# Patient Record
Sex: MALE | Race: WHITE | NOT HISPANIC OR LATINO | Employment: FULL TIME | URBAN - METROPOLITAN AREA
[De-identification: names, ages, dates, MRNs, and addresses within clinical notes are randomized per-mention and may not be internally consistent; named-entity substitution may affect disease eponyms.]

---

## 2019-02-12 ENCOUNTER — HISTORICAL (OUTPATIENT)
Dept: LAB | Facility: HOSPITAL | Age: 54
End: 2019-02-12

## 2019-07-30 ENCOUNTER — HISTORICAL (OUTPATIENT)
Dept: ADMINISTRATIVE | Facility: HOSPITAL | Age: 54
End: 2019-07-30

## 2019-07-30 LAB
ALBUMIN SERPL-MCNC: 4.3 GM/DL (ref 3.4–5)
ALBUMIN/GLOB SERPL: 1.39 {RATIO} (ref 1.5–2.5)
ALP SERPL-CCNC: 85 UNIT/L (ref 38–126)
ALT SERPL-CCNC: 23 UNIT/L (ref 7–52)
AST SERPL-CCNC: 19 UNIT/L (ref 15–37)
BILIRUB SERPL-MCNC: 0.8 MG/DL (ref 0.2–1)
BILIRUBIN DIRECT+TOT PNL SERPL-MCNC: 0.2 MG/DL (ref 0–0.5)
BILIRUBIN DIRECT+TOT PNL SERPL-MCNC: 0.6 MG/DL
BUN SERPL-MCNC: 19 MG/DL (ref 7–18)
CALCIUM SERPL-MCNC: 8.9 MG/DL (ref 8.5–10)
CHLORIDE SERPL-SCNC: 102 MMOL/L (ref 98–107)
CHOLEST SERPL-MCNC: 130 MG/DL (ref 0–200)
CHOLEST/HDLC SERPL: 3 {RATIO}
CO2 SERPL-SCNC: 29 MMOL/L (ref 21–32)
CREAT SERPL-MCNC: 1.02 MG/DL (ref 0.6–1.3)
EST. AVERAGE GLUCOSE BLD GHB EST-MCNC: 192 MG/DL
GLOBULIN SER-MCNC: 3.1 GM/DL (ref 1.2–3)
GLUCOSE SERPL-MCNC: 238 MG/DL (ref 74–106)
HBA1C MFR BLD: 8.3 % (ref 4.4–6.4)
HDLC SERPL-MCNC: 44 MG/DL (ref 35–60)
LDLC SERPL CALC-MCNC: 69 MG/DL (ref 0–129)
POTASSIUM SERPL-SCNC: 4.7 MMOL/L (ref 3.5–5.1)
PROT SERPL-MCNC: 7.4 GM/DL (ref 6.4–8.2)
SODIUM SERPL-SCNC: 136 MMOL/L (ref 136–145)
TRIGL SERPL-MCNC: 57 MG/DL (ref 30–150)
VLDLC SERPL CALC-MCNC: 11.4 MG/DL

## 2021-06-01 ENCOUNTER — HISTORICAL (OUTPATIENT)
Dept: ADMINISTRATIVE | Facility: HOSPITAL | Age: 56
End: 2021-06-01

## 2021-06-01 LAB
ERYTHROCYTE [DISTWIDTH] IN BLOOD BY AUTOMATED COUNT: 13.5 % (ref 11.5–17)
PLATELET # BLD AUTO: 171 X10(3)/MCL (ref 130–400)
RBC # BLD AUTO: 4.93 X10(6)/MCL (ref 4.7–6.1)
WBC # SPEC AUTO: 6.6 X10(3)/MCL (ref 4.5–11.5)

## 2022-01-04 LAB — EST CREAT CLEARANCE SER (OHS): 140.41 ML/MIN

## 2022-04-07 ENCOUNTER — HISTORICAL (OUTPATIENT)
Dept: ADMINISTRATIVE | Facility: HOSPITAL | Age: 57
End: 2022-04-07

## 2022-04-24 VITALS
HEIGHT: 64 IN | WEIGHT: 270.06 LBS | DIASTOLIC BLOOD PRESSURE: 78 MMHG | SYSTOLIC BLOOD PRESSURE: 132 MMHG | BODY MASS INDEX: 46.11 KG/M2

## 2022-05-01 NOTE — HISTORICAL [facility]
This is a historical note converted from [facility]. Formatting and pictures may have been removed.  Please reference [facility] for original formatting and attached multimedia. Chief Complaint  4 month recheck  History of Present Illness  56-year-old male presents office today for 4-month recheck of?chronic conditions including diabetes mellitus, hyperlipidemia, gout, low testosterone level, insomnia.? Also carries with him?a history of?LUCI on CPAP therapy.? Also due for?testosterone recheck and?PSA check today.? For the most part,?reports 100% compliance with all prescribed medications. ?Denies any side effects as adverse reaction/intolerance. ?Medications are working well/as intended. ?Patient ?is receiving desired effects. ?He expresses that both he and his wife, for short period of time,?are unable to?afford?prescription?GLP-1 agonist?and as a result they have stopped taking this medication.? Due for recheck of hemoglobin A1c/CMP today.? Reports 100%?compliance with nightly CPAP usage.? Receiving desired effects.  Review of Systems  ?14 point Review of Systems performed with no exceptions for new complaints other than as noted in HPI.  Physical Exam  Vitals & Measurements  HR:?78(Peripheral)? BP:?122/72?  HT:?162.56?cm? HT:?162.56?cm? WT:?123.7?kg? WT:?123.700?kg? BMI:?46.81?  Well developed, well nourished, NAD, alert and oriented x4  Vitals reviewed  Head: NCAT  Eyes: EOMI, conjunctiva WNL, pupils symmetric  Ears: TMs and EACs clear  Nose: Mucosa WNL, No discharge, No sinus tenderness  O/P: No erythema or exudate  Neck: supple, FROM, no LA, no thyromegaly, no bruits auscultated  CV: RRR no MRG, peripheral pulses intact  Pulmonary: Effort normal and breath sounds normal, no wheeze  Abdomen: soft, NTND, no HSM; ? NABS; no peritoneal signs ? ??  Musculoskeletal: ?no tenderness, joints wnl for acute changes, FROM, no CCE  Skin: warm, dry, intact  Neuro: no motor/sensory deficits, cranial nerves grossly intact, cerebellar  function appears intact  Lymphadenopathy: no abnormalities noted  Psychiatric: Cooperative, appropriate mood and affect, appears to have normal judgment  ?  Assessment/Plan  1.?Diabetes mellitus, type 2?E11.9  ?Check hemoglobin A1c?and CMP.? Information provided and given concerning?patient assistance program?by drug /pharmaceutical company?which is the  of his GLP-1 agonist.? Samples given?of Rybelsus today.  2.?Low testosterone?R79.89  ?Check serum testosterone level  3.?HLD (hyperlipidemia)?E78.5  Well-controlled on current medication regimen. ?Continue as prescribed.? Check fasting lipid panel  4.?Microalbuminuria?R80.9  ?Check?urine today  5.?Gout?M10.9  ?No gout flareups. ?Medication regimen  6.?Prostate cancer screening?Z12.5  ?Check PSA  7.?LUCI on CPAP?G47.33  ?Receiving desired effects from nightly use.  8.?Insomnia?G47.00  ?Insomnia is improved with meds; discussed black box warning and precautions regarding abnormal behavior on sedatives; has had no problems; reviewed recommendation to avoid etoh and any other mind altering substances when taking sleep aids; patient will d/c meds and call if has any trouble;  Referrals  Clinic Follow up, *Est. 10/01/21 9:30:00 CDT, Order for future visit, Insomnia, [facility]   Problem List/Past Medical History  Ongoing  BDR (background diabetic retinopathy)  Binge eating disorder  Chronic venous stasis dermatitis of both lower extremities  COVID-19 virus infection  Diabetes mellitus, type 2  ED (erectile dysfunction)  Edema  Family history of prostate cancer  Gout  HLD (hyperlipidemia)  Insomnia  Low testosterone  Medication management  Microalbuminuria  Morbid obesity  LUCI on CPAP  Osteopenia  Right inguinal hernia  Varicose veins of legs  Wellness examination  Historical  Diabetes  High blood pressure  Procedure/Surgical History  Circumcision, surgical excision other than clamp, device, or dorsal slit; older than 28 days of age (2020)    Medications  18 g needles and syringe, See Instructions, 1 refills  23 g needles, See Instructions, 1 refills  allopurinol 100 mg oral tablet, See Instructions  aspirin 325 mg oral tablet, 325 mg= 1 tab(s), Oral, Daily, 3 refills  Bd 3ml Luer-Gavi Syringe 18g X 1 1/2 X 1-1/2 Mis Bd M, See Instructions, 2 refills  Bd Disposable Needle 23gx1 Precision Glide Mis Bd D, See Instructions, 2 refills  Contour Next, See Instructions  Contour test strips, See Instructions, 3 refills  cyclobenzaprine 10 mg oral tablet, See Instructions, 3 refills  glimepiride 2 mg oral tablet, See Instructions, 1 refills  indomethacin 50 mg oral capsule, 50 mg= 1 cap(s), Oral, BID, 1 refills  lisinopril 5 mg oral tablet, 5 mg= 1 tab(s), Oral, Daily  meloxicam 15 mg oral tablet, 15 mg= 1 tab(s), Oral, Daily, 1 refills  MetFORMIN (Eqv-Glucophage XR) 500 mg oral tablet, extended release, See Instructions  metoprolol succinate 50 mg oral tablet, extended release, See Instructions  Pen needles, See Instructions, 3 refills  Ranexa 500mg Tab extended release, 500 mg= 1 tab(s), Oral, BID  rosuvastatin 20 mg oral tablet, See Instructions  Rybelsus 14 mg oral tablet, 14 mg= 1 tab(s), Oral, Daily, 5 refills  sildenafil 100 mg oral tablet, 100 mg= 1 tab(s), Oral, As Directed, 10 refills  syringes and 22g 1 1/2 inch needle, See Instructions, 1 refills  Test strips and lancets, See Instructions, 3 refills  testosterone cypionate 200 mg/mL intramuscular solution, See Instructions  zolpidem 10 mg oral tablet, 10 mg= 1 tab(s), Oral, qPM  Allergies  No Known Medication Allergies  Social History  Abuse/Neglect  No, 06/01/2021  Alcohol  Current, 04/26/2018  Employment/School  Employed, Work/School description: owns chicken on the [employer] 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 Living situation: Home/Independent., 04/26/2018  Substance Use  Never, 04/26/2018  Tobacco  Former smoker, quit more than 30 days ago, N/A, 06/01/2021  Family  History  Low testosterone: Brother.  Osteoarthritis: Mother.  PD: Father.  Prostate cancer: Father.  Immunizations  Vaccine Date Status   influenza virus vaccine, inactivated 10/14/2020 Given   influenza virus vaccine, inactivated 10/22/2019 Recorded   pneumococcal 13-valent conjugate vaccine 05/2014 Recorded   tetanus/diphth/pertuss (Tdap) adult/adol 11/13/2012 Recorded   pneumococcal 23-polyvalent vaccine 05/12/2011 Recorded   Health Maintenance  Health Maintenance  ???Pending?(in the next year)  ??? ??OverDue  ??? ? ? ?Depression Screening due??08/23/17??and every 1??year(s)  ??? ? ? ?Alcohol Misuse Screening due??01/02/21??and every 1??year(s)  ??? ??Due?  ??? ? ? ?ADL Screening due??06/16/21??and every 1??year(s)  ??? ? ? ?Diabetes Maintenance-Foot Exam due??06/16/21??Unknown Frequency  ??? ? ? ?Zoster Vaccine due??06/16/21??Unknown Frequency  ??? ??Due In Future?  ??? ? ? ?Diabetes Maintenance-Eye Exam not due until??08/18/21??and every 1??year(s)  ??? ? ? ?Influenza Vaccine not due until??10/01/21??and every 1??day(s)  ??? ? ? ?Obesity Screening not due until??01/01/22??and every 1??year(s)  ??? ? ? ?Aspirin Therapy for CVD Prevention not due until??01/25/22??and every 1??year(s)  ??? ? ? ?Blood Pressure Screening not due until??06/01/22??and every 1??year(s)  ??? ? ? ?Body Mass Index Check not due until??06/01/22??and every 1??year(s)  ??? ? ? ?Diabetes Maintenance-HgbA1c not due until??06/01/22??and every 1??year(s)  ??? ? ? ?Diabetes Maintenance-Fasting Lipid Profile not due until??06/01/22??and every 1??year(s)  ??? ? ? ?Diabetes Maintenance-Serum Creatinine not due until??06/01/22??and every 1??year(s)  ???Satisfied?(in the past 1 year)  ??? ??Satisfied?  ??? ? ? ?Aspirin Therapy for CVD Prevention on??01/25/21.??Satisfied by Anil Arambula MD  ??? ? ? ?Blood Pressure Screening on??06/01/21.??Satisfied by Gemma Ashraf LPN  ??? ? ? ?Body Mass Index Check on??06/01/21.??Satisfied by Gemma Ashraf LPN  ??? ?  ? ?Diabetes Maintenance-Eye Exam on??08/18/20.??Satisfied by Helena Sr  ??? ? ? ?Diabetes Maintenance-Fasting Lipid Profile on??06/01/21.??Satisfied by Jose De Jesus Troncoso  ??? ? ? ?Diabetes Maintenance-HgbA1c on??06/01/21.??Satisfied by Jose De Jesus Troncoso  ??? ? ? ?Diabetes Maintenance-Serum Creatinine on??06/01/21.??Satisfied by Jose De Jesus Troncoso  ??? ? ? ?Diabetes Screening on??06/01/21.??Satisfied by Jose De Jesus Troncoso  ??? ? ? ?Hypertension Management-BMP on??06/01/21.??Satisfied by Jose De Jesus Troncoso  ??? ? ? ?Hypertension Management-Blood Pressure on??06/01/21.??Satisfied by Gemma Ashraf LPN  ??? ? ? ?Influenza Vaccine on??10/14/20.??Satisfied by Anil Arambula MD  ??? ? ? ?Lipid Screening on??06/01/21.??Satisfied by Jose De Jesus Troncoso  ??? ? ? ?Obesity Screening on??06/01/21.??Satisfied by Gemma Ashraf LPN  ?      Physician?was in the building when patient was?seen and examined by the nurse practitioner.? I concur with the?assessment/plan/management?of the patient.

## 2022-05-04 PROBLEM — E11.29 TYPE 2 DIABETES MELLITUS WITH MICROALBUMINURIA, WITHOUT LONG-TERM CURRENT USE OF INSULIN: Status: ACTIVE | Noted: 2022-05-04

## 2022-05-04 PROBLEM — R79.89 LOW TESTOSTERONE: Status: ACTIVE | Noted: 2022-05-04

## 2022-05-04 PROBLEM — I87.2 VENOUS STASIS DERMATITIS OF BOTH LOWER EXTREMITIES: Status: ACTIVE | Noted: 2022-05-04

## 2022-05-04 PROBLEM — E78.2 MIXED HYPERLIPIDEMIA: Status: ACTIVE | Noted: 2022-05-04

## 2022-05-04 PROBLEM — M1A.00X0 IDIOPATHIC CHRONIC GOUT WITHOUT TOPHUS: Status: ACTIVE | Noted: 2022-05-04

## 2022-05-04 PROBLEM — I10 PRIMARY HYPERTENSION: Status: ACTIVE | Noted: 2022-05-04

## 2022-05-04 PROBLEM — R80.9 TYPE 2 DIABETES MELLITUS WITH MICROALBUMINURIA, WITHOUT LONG-TERM CURRENT USE OF INSULIN: Status: ACTIVE | Noted: 2022-05-04

## 2022-05-04 PROBLEM — G47.33 OSA ON CPAP: Status: ACTIVE | Noted: 2022-05-04

## 2022-08-30 PROBLEM — R60.9 EDEMA: Status: ACTIVE | Noted: 2022-08-30

## 2022-08-30 PROBLEM — E78.5 HYPERLIPIDEMIA: Status: ACTIVE | Noted: 2022-05-04

## 2022-08-30 PROBLEM — M10.9 GOUT: Status: ACTIVE | Noted: 2022-08-30

## 2022-08-30 PROBLEM — E11.9 TYPE 2 DIABETES MELLITUS: Status: ACTIVE | Noted: 2022-05-04

## 2022-08-30 PROBLEM — M85.80 OSTEOPENIA: Status: ACTIVE | Noted: 2022-08-30

## 2022-08-30 PROBLEM — I83.90 VARICOSE VEINS OF LOWER EXTREMITY: Status: ACTIVE | Noted: 2022-08-30

## 2022-08-30 PROBLEM — F50.819 BINGE EATING DISORDER: Status: ACTIVE | Noted: 2022-08-30

## 2022-08-30 PROBLEM — G47.00 INSOMNIA: Status: ACTIVE | Noted: 2022-08-30

## 2022-08-30 PROBLEM — E66.01 MORBID OBESITY: Status: ACTIVE | Noted: 2022-08-30

## 2022-08-30 PROBLEM — F50.81 BINGE EATING DISORDER: Status: ACTIVE | Noted: 2022-08-30

## 2022-08-30 PROBLEM — R80.9 MICROALBUMINURIA: Status: ACTIVE | Noted: 2022-08-30

## 2022-08-30 PROBLEM — E11.3299 NONPROLIFERATIVE DIABETIC RETINOPATHY: Status: ACTIVE | Noted: 2022-08-30

## 2022-08-30 PROBLEM — E34.9 TESTOSTERONE DEFICIENCY: Status: ACTIVE | Noted: 2022-08-30

## 2022-09-15 ENCOUNTER — HISTORICAL (OUTPATIENT)
Dept: ADMINISTRATIVE | Facility: HOSPITAL | Age: 57
End: 2022-09-15

## 2022-12-23 PROCEDURE — 86803 HEPATITIS C AB TEST: CPT | Performed by: FAMILY MEDICINE

## 2022-12-23 PROCEDURE — 87389 HIV-1 AG W/HIV-1&-2 AB AG IA: CPT | Performed by: FAMILY MEDICINE

## 2023-01-24 ENCOUNTER — CLINICAL SUPPORT (OUTPATIENT)
Dept: BARIATRICS | Facility: HOSPITAL | Age: 58
End: 2023-01-24
Attending: SURGERY
Payer: COMMERCIAL

## 2023-01-24 ENCOUNTER — CLINICAL SUPPORT (OUTPATIENT)
Dept: BARIATRICS | Facility: HOSPITAL | Age: 58
End: 2023-01-24
Attending: SURGERY

## 2023-01-24 VITALS — BODY MASS INDEX: 44.67 KG/M2 | WEIGHT: 268.13 LBS | HEIGHT: 65 IN

## 2023-01-24 NOTE — PROGRESS NOTES
BEHAVIOR MODIFICATION AND EXERCISE CONSULT    Non Surgical: [] Surgical: [x]      PERSONAL:     What initiated your interest in bariatric surgery?   Better quality of health    Marital Status: []Single   [x]   []   []      Do you have children? 2 (22 and 25 y.o.)    What is your highest level of education completed? Some college    Who is your social or relational support? My wife    Do you work? 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 yes        PHYSICAL ACTIVITY:    Do you currently exercise: []Yes   [x]No    If so, please describe:      Have you experienced any injuries and/or restrictions that may limit your physical activity? [x]Yes   [x]No    How do you feel about exercise? Enjoy it, used to exercise before COVID.      BEHAVIORS:    What behavior(s) would you like to change in order to be healthy? Consistent exercise routine, eating heatlhier, stop snacking.    On a scale of 1-10 (1-extremely low, 10-extremely high), how motivated are you to change your behavior(s)? 8    Do you currently use any type of tobacco products (vape, dip, cigarettes, etc.) No    If yes, on average, how many and/or how often do you use these products on a daily basis?    How many hours of sleep do you average? 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    Is your appetite affected by stress? yes    How do you cope and/or manage stress? hunting      NOTES: A body composition was conducted and patient was educated on results. Current weight is 266.7 lbs. Goal weight is 190-200 lbs. Waist/hip is 52/51 inches. Handout: emotional connections to food.    Isabella Miller, MILLY, CPT, CHC

## 2023-01-24 NOTE — PROGRESS NOTES
NUTRITIONAL CONSULT    Initial assessment for sleeve gastrectomy   Non Surgical: []      PAST HISTORY:   Dieting attempts include self directed dieting, wt loss meds ([facility]), atkins, ketogenic diets   Highest weight: 275#    CLINICAL DATA:  Height: 64.5in  Weight: 268 lbs  IBW: 133 lbs  BMI: 45 kg/m2  Bariatric goal weight (125% EBW): 166 lbs  Patient's goal weight: 190-200 lbs    FAMILY HISTORY OF OBESITY:   [x]Yes    []No            WHAT SIDE OF THE FAMILY?   []Mother   []Father   [x]Sibling   []Child     []Extended    []Adopted       Goal for Bariatric Surgery: to improve health and reduce medications    NUTRITION & HEALTH HISTORY:  Greatest challenge:  owning a restaurant, lack of results when dieting in past    Current Dietary Patterns:  Meal pattern: 2-3 meals per day   Snacking: throughout the evening / day Type: sweets, cookies, cheese, nuts  Vegetables: Likes a variety. Eats daily.  Fruits: Likes a variety. Eats daily.  Beverages: water, diet soda, coffee without sugar, and hot tea  Alcohol consumption: Monthly. Type:   Dining out: Daily. Mostly  pt owns a restaurant .  Grocery shopping and food prep: []Self   [x]Spouse   []Other:   Emotional eating: [x]Yes   []No Which emotions if yes: stress  Nighttime snacking: [x]Yes   []No Middle of night: []Yes   [x]No  Before bedtime: [x]Yes   []No  goes to bed around 1AM  Hx of disordered eating behaviors: []Yes   []No Anorexia: []Yes   []No Bulimia: []Yes   []No  Purging: []Yes   []No   Binging: [x]Yes   []No  History of vitamin/mineral deficiencies:   []Yes   [x]No    Vitamins / Minerals / Herbs:   MVI    Food Allergies:   NKFA      ASSESSMENT:  Patient reports attempts at weight loss, only to regain lost weight.  Patient demonstrated knowledge of healthy eating behaviors and exercise patterns; admits to not eating healthy and not exercising at this point.  Patient states willingness to change lifestyle and make behavior modifications.  Expect good   compliance after surgery at this time.        ESTIMATED NEEDS:  Calories: 5742-1424 kcals/d  (20-25 kcal/kg adjusted BW/d)  Protein:  75-83g/d  (1.0-1.1 g/kg adjusted BW/d)  Fluid:   2432 ml/d  (20 mL/kg actual BW/d)    BARIATRIC DIET DISCUSSION:  Discussed diet after surgery and related to patients food record.  Reviewed diet progression before and after surgery.  Stressed importance of exercise and its role in achieving weight loss goals.    RECOMMENDATIONS:  Patient is a good candidate for bariatric surgery.      PLAN:  Resume work-up for surgery.  Continue to review Bariatric Nutrition Guidebook at home and call with any questions.  Reduce frequency of dining out.

## 2023-04-18 ENCOUNTER — CLINICAL SUPPORT (OUTPATIENT)
Dept: BARIATRICS | Facility: HOSPITAL | Age: 58
End: 2023-04-18

## 2023-04-18 NOTE — PROGRESS NOTES
Date of education: 4/18/23  Pt education type: [x]Pre op  []Post op  Surgery date: 5/15/23  Type of surgery: sleeve gastrectomy     Education was provided on:   []Importance of protein and vitamin protocol  []Importance of drinking  fl oz/day of non carbonated sugar free non caffeinated beverages  []Importance of following dietary protocol  []Importance of early ambulation postop   []Use of incentive spirometer 10 times an hour while awake  []Non opiod pain management post op   []Discontinuing use of meds containing aspirin, ibuprofen, NSAIDs, post op  []Signs and symptoms of immediate and long term complications post-op  []Prevention and signs and symptoms of blood clots   []Prevention and signs of infection  []Reviewed medication regimen  [x]Importance of adhering to behavioral changes  [x]Importance of following exercise protocol      Barriers to learning:  [x]None evident  []Acuity of illness  []Cognitive defects  []Cultural barriers  []Desire/Motivation  []Difficulty concentrating  []Emotional state  []Financial concerns  []Hearing deficit  []Language barrier  []Literacy  []Memory problems  []Vision impairment     Teaching methods:  [x]Demonstration  []Explanation  []Printed materials  []Teach back  []Virtual/web based    Expected Compliance:  [x]Good  []Fair  []Poor    Additional Notes:  A body composition was conducted.

## 2023-04-24 PROCEDURE — 85730 THROMBOPLASTIN TIME PARTIAL: CPT | Performed by: FAMILY MEDICINE

## 2023-04-28 ENCOUNTER — PATIENT MESSAGE (OUTPATIENT)
Dept: ADMINISTRATIVE | Facility: OTHER | Age: 58
End: 2023-04-28
Payer: COMMERCIAL

## 2023-05-01 ENCOUNTER — CLINICAL SUPPORT (OUTPATIENT)
Dept: BARIATRICS | Facility: HOSPITAL | Age: 58
End: 2023-05-01

## 2023-05-01 VITALS — WEIGHT: 256 LBS | BODY MASS INDEX: 42.65 KG/M2 | HEIGHT: 65 IN

## 2023-05-01 VITALS — BODY MASS INDEX: 42.68 KG/M2 | WEIGHT: 256.5 LBS

## 2023-05-01 NOTE — PROGRESS NOTES
Patient attended preop education visit with team. Patient missed 2 on questions 13-18. Corrections were discussed. No issues noted. Patient has lost 12 lbs.     MILLY Flaherty, CPT, CHC

## 2023-05-01 NOTE — PROGRESS NOTES
Pt attended pre-op visit with bariatric team and completed questionnaire. Pre- and immediate post-op guidelines were reviewed. Pt confirmed knowledge and expressed understanding.     Weight: 256#  Weight loss since pre-op class:  -12#

## 2023-05-02 NOTE — PROGRESS NOTES
Date of education: 4/18/23  Pt education type: [x]Pre op  []Post op  Surgery date: 5/15/23  Type of surgery: sleeve gastrectomy     Education was provided on:   []Importance of protein and vitamin protocol  []Importance of drinking  fl oz/day of non carbonated sugar free non caffeinated beverages  []Importance of following dietary protocol  [x]Importance of early ambulation postop   [x]Use of incentive spirometer 10 times an hour while awake  [x]Non opiod pain management post op   [x]Discontinuing use of meds containing aspirin, ibuprofen, NSAIDs, post op  [x]Signs and symptoms of immediate and long term complications post-op  [x]Prevention and signs and symptoms of blood clots   [x]Prevention and signs of infection  [x]Reviewed medication regimen  []Importance of adhering to behavioral changes  []Importance of following exercise protocol      Barriers to learning:  [x]None evident  []Acuity of illness  []Cognitive defects  []Cultural barriers  []Desire/Motivation  []Difficulty concentrating  []Emotional state  []Financial concerns  []Hearing deficit  []Language barrier  []Literacy  []Memory problems  []Vision impairment     Teaching methods:  [x]Demonstration  []Explanation  []Printed materials  []Teach back  []Virtual/web based    Expected Compliance:  [x]Good  []Fair  []Poor

## 2023-05-08 ENCOUNTER — CLINICAL SUPPORT (OUTPATIENT)
Dept: BARIATRICS | Facility: HOSPITAL | Age: 58
End: 2023-05-08

## 2023-05-08 VITALS — WEIGHT: 251 LBS | BODY MASS INDEX: 41.82 KG/M2 | HEIGHT: 65 IN

## 2023-05-11 NOTE — H&P
[facility] Surgical - Periop Services  History & Physical  Surgery    SUBJECTIVE:     Chief Complaint/Reason for Admission: The patient is a 58 y.o. obese male admitted for elective vertical sleeve gastrectomy.  Body mass index is 41.77 kg/m².     History of Present Illness:       Patient Active Problem List    Diagnosis Date Noted    Binge eating disorder 08/30/2022    Edema 08/30/2022    Gout 08/30/2022    Insomnia 08/30/2022    Microalbuminuria 08/30/2022    Morbid obesity with BMI of 40.0-44.9, adult 08/30/2022    Nonproliferative diabetic retinopathy 08/30/2022    Osteopenia 08/30/2022    Testosterone deficiency 08/30/2022    Varicose veins of lower extremity 08/30/2022    DIABETES 05/04/2022    Hyperlipidemia 05/04/2022    Low testosterone 05/04/2022    Idiopathic chronic gout without tophus 05/04/2022    Primary hypertension 05/04/2022    LUCI on CPAP 05/04/2022    Venous stasis dermatitis 05/04/2022     No medications prior to admission.     Review of patient's allergies indicates:  No Known Allergies     Past Medical History:   Diagnosis Date    Asymptomatic varicose veins of unspecified lower extremity     BDR (background diabetic retinopathy)     Binge eating disorder     Chronic venous stasis dermatitis of both lower extremities     Family history of prostate cancer     Gout, unspecified     History of COVID-19     HLD (hyperlipidemia)     Insomnia     Low testosterone     Male erectile dysfunction, unspecified     Microalbuminuria     Obesity, unspecified     LUCI (obstructive sleep apnea)     Osteopenia     Right inguinal hernia     Swelling     Type 2 diabetes mellitus without complications       Past Surgical History:   Procedure Laterality Date    CIRCUMCISION      COLONOSCOPY      ESOPHAGOGASTRODUODENOSCOPY N/A 3/17/2023    Procedure: EGD (ESOPHAGOGASTRODUODENOSCOPY);  Surgeon: Dm Tomas MD;  Location: [facility] OR;  Service: General;  Laterality: N/A;      Family History   Problem Relation  "Age of Onset    Diabetes Maternal Uncle       Social History     Tobacco Use    Smoking status: Former     Years: 15.00     Types: Cigarettes    Smokeless tobacco: Never   Substance Use Topics    Alcohol use: Not Currently          Review of Systems:  Pertinent items are noted in HPI.    OBJECTIVE:     Vital signs in last 24 hours:         Ht 5' 5" (1.651 m)   Wt 113.9 kg (251 lb)   BMI 41.77 kg/m²     General Appearance:    Alert, cooperative, no distress, appears stated age   Head:    Normocephalic, without obvious abnormality, atraumatic   Eyes:    PERRL, conjunctiva/corneas clear, EOM's intact, fundi     benign, both eyes   Ears:    Normal TM's and external ear canals, both ears   Nose:   Nares normal, septum midline, mucosa normal, no drainage    or sinus tenderness   Throat:   Lips, mucosa, and tongue normal; teeth and gums normal   Neck:   Supple, symmetrical, trachea midline, no adenopathy;     thyroid:  no enlargement/tenderness/nodules; no carotid    bruit or JVD   Back:     Symmetric, no curvature, ROM normal, no CVA tenderness   Lungs:     Clear to auscultation bilaterally, respirations unlabored   Chest Wall:    No tenderness or deformity    Heart:    Regular rate and rhythm, S1 and S2 normal, no murmur, rub   or gallop   Breast Exam:    No tenderness, masses, or nipple abnormality   Abdomen:     Soft, non-tender, bowel sounds active all four quadrants,     no masses, no organomegaly   Genitalia:    Normal female without lesion, discharge or tenderness   Rectal:    Normal tone, no masses or tenderness;    guaiac negative stool   Extremities:   Extremities normal, atraumatic, no cyanosis or edema   Pulses:   2+ and symmetric all extremities   Skin:   Skin color, texture, turgor normal, no rashes or lesions   Lymph nodes:   Cervical, supraclavicular, and axillary nodes normal   Neurologic:   CNII-XII intact, normal strength, sensation and reflexes     throughout       Data Review: CBC:   Lab Results "   Component Value Date    WBC 5.4 04/24/2023    RBC 5.23 04/24/2023    HGB 14.2 04/24/2023    HCT 42.7 04/24/2023     04/24/2023     BMP:   Lab Results   Component Value Date     04/24/2023    K 4.6 04/24/2023    CO2 31 04/24/2023    BUN 20.0 (H) 04/24/2023    CREATININE 1.02 04/24/2023    CALCIUM 9.7 04/24/2023     Coagulation: No results found for: PT, INR, APTT    ASSESSMENT/PLAN:     Morbid obesity with failure of conservative therapy.      The patient was informed that risks include, but are not limited to: death, staple line leak, obstruction, bleeding, and sepsis. Any of these could require further surgery. Other risks include DVT, PE, pneumonia, wound dehiscence, hernia, wound infection, the need for dilatations of his sleeve gastrectomy, vitamin abnormalites, and the inability to lose appropriate weight and keep it off.     We discussed that our goal is to ameliorate his medical problems and not to obtain a specific body mass index. He understands the risks and benefits and wishes to proceed with the procedure. He has signed a consent form.

## 2023-05-14 ENCOUNTER — ANESTHESIA EVENT (OUTPATIENT)
Dept: SURGERY | Facility: HOSPITAL | Age: 58
DRG: 621 | End: 2023-05-14

## 2023-05-15 ENCOUNTER — HOSPITAL ENCOUNTER (INPATIENT)
Facility: HOSPITAL | Age: 58
LOS: 1 days | Discharge: HOME | DRG: 621 | End: 2023-05-16
Attending: SURGERY | Admitting: SURGERY

## 2023-05-15 ENCOUNTER — ANESTHESIA (OUTPATIENT)
Dept: SURGERY | Facility: HOSPITAL | Age: 58
DRG: 621 | End: 2023-05-15

## 2023-05-15 DIAGNOSIS — E66.01 MORBID OBESITY: ICD-10-CM

## 2023-05-15 LAB
ABORH RETYPE: NORMAL
GROUP & RH: NORMAL
INDIRECT COOMBS GEL: NORMAL
POCT GLUCOSE: 110 MG/DL (ref 70–110)
POCT GLUCOSE: 160 MG/DL (ref 70–110)
POCT GLUCOSE: 176 MG/DL (ref 70–110)
POCT GLUCOSE: 232 MG/DL (ref 70–110)
SPECIMEN OUTDATE: NORMAL

## 2023-05-15 PROCEDURE — 63600175 PHARM REV CODE 636 W HCPCS: Performed by: ANESTHESIOLOGY

## 2023-05-15 PROCEDURE — D9220A PRA ANESTHESIA: ICD-10-PCS | Mod: ,,, | Performed by: ANESTHESIOLOGY

## 2023-05-15 PROCEDURE — 82962 GLUCOSE BLOOD TEST: CPT | Performed by: SURGERY

## 2023-05-15 PROCEDURE — 25000003 PHARM REV CODE 250: Performed by: NURSE PRACTITIONER

## 2023-05-15 PROCEDURE — 71000033 HC RECOVERY, INTIAL HOUR: Performed by: SURGERY

## 2023-05-15 PROCEDURE — 71000015 HC POSTOP RECOV 1ST HR: Performed by: SURGERY

## 2023-05-15 PROCEDURE — 11000001 HC ACUTE MED/SURG PRIVATE ROOM

## 2023-05-15 PROCEDURE — 37000009 HC ANESTHESIA EA ADD 15 MINS: Performed by: SURGERY

## 2023-05-15 PROCEDURE — 63600175 PHARM REV CODE 636 W HCPCS: Performed by: NURSE ANESTHETIST, CERTIFIED REGISTERED

## 2023-05-15 PROCEDURE — 63600175 PHARM REV CODE 636 W HCPCS: Performed by: NURSE PRACTITIONER

## 2023-05-15 PROCEDURE — 36000711: Performed by: SURGERY

## 2023-05-15 PROCEDURE — 25000003 PHARM REV CODE 250: Performed by: NURSE ANESTHETIST, CERTIFIED REGISTERED

## 2023-05-15 PROCEDURE — 37000008 HC ANESTHESIA 1ST 15 MINUTES: Performed by: SURGERY

## 2023-05-15 PROCEDURE — 86900 BLOOD TYPING SEROLOGIC ABO: CPT | Performed by: SURGERY

## 2023-05-15 PROCEDURE — 27201423 OPTIME MED/SURG SUP & DEVICES STERILE SUPPLY: Performed by: SURGERY

## 2023-05-15 PROCEDURE — 71000039 HC RECOVERY, EACH ADD'L HOUR: Performed by: SURGERY

## 2023-05-15 PROCEDURE — C9290 INJ, BUPIVACAINE LIPOSOME: HCPCS | Performed by: SURGERY

## 2023-05-15 PROCEDURE — A4216 STERILE WATER/SALINE, 10 ML: HCPCS | Performed by: SURGERY

## 2023-05-15 PROCEDURE — 63600175 PHARM REV CODE 636 W HCPCS

## 2023-05-15 PROCEDURE — 25000003 PHARM REV CODE 250: Performed by: SURGERY

## 2023-05-15 PROCEDURE — 63600175 PHARM REV CODE 636 W HCPCS: Performed by: SURGERY

## 2023-05-15 PROCEDURE — 36000710: Performed by: SURGERY

## 2023-05-15 PROCEDURE — D9220A PRA ANESTHESIA: Mod: ,,, | Performed by: ANESTHESIOLOGY

## 2023-05-15 PROCEDURE — 71000016 HC POSTOP RECOV ADDL HR: Performed by: SURGERY

## 2023-05-15 RX ORDER — RANOLAZINE 500 MG/1
500 TABLET, EXTENDED RELEASE ORAL 2 TIMES DAILY
Status: DISCONTINUED | OUTPATIENT
Start: 2023-05-16 | End: 2023-05-16 | Stop reason: HOSPADM

## 2023-05-15 RX ORDER — CLONIDINE 0.1 MG/24H
1 PATCH, EXTENDED RELEASE TRANSDERMAL ONCE AS NEEDED
Status: DISCONTINUED | OUTPATIENT
Start: 2023-05-15 | End: 2023-05-16 | Stop reason: HOSPADM

## 2023-05-15 RX ORDER — ONDANSETRON 2 MG/ML
4 INJECTION INTRAMUSCULAR; INTRAVENOUS DAILY PRN
Status: DISCONTINUED | OUTPATIENT
Start: 2023-05-15 | End: 2023-05-15

## 2023-05-15 RX ORDER — KETOROLAC TROMETHAMINE 30 MG/ML
30 INJECTION, SOLUTION INTRAMUSCULAR; INTRAVENOUS EVERY 6 HOURS
Status: DISCONTINUED | OUTPATIENT
Start: 2023-05-15 | End: 2023-05-16 | Stop reason: HOSPADM

## 2023-05-15 RX ORDER — PANTOPRAZOLE SODIUM 40 MG/1
40 TABLET, DELAYED RELEASE ORAL DAILY
Qty: 30 TABLET | Refills: 0 | Status: SHIPPED | OUTPATIENT
Start: 2023-05-15 | End: 2023-08-28

## 2023-05-15 RX ORDER — ACETAMINOPHEN 500 MG
1000 TABLET ORAL
Status: COMPLETED | OUTPATIENT
Start: 2023-05-15 | End: 2023-05-15

## 2023-05-15 RX ORDER — HYDRALAZINE HYDROCHLORIDE 20 MG/ML
10 INJECTION INTRAMUSCULAR; INTRAVENOUS EVERY 6 HOURS PRN
Status: DISCONTINUED | OUTPATIENT
Start: 2023-05-15 | End: 2023-05-16 | Stop reason: HOSPADM

## 2023-05-15 RX ORDER — KETOROLAC TROMETHAMINE 10 MG/1
10 TABLET, FILM COATED ORAL EVERY 6 HOURS
Qty: 12 TABLET | Refills: 0 | Status: SHIPPED | OUTPATIENT
Start: 2023-05-15 | End: 2023-05-18

## 2023-05-15 RX ORDER — KETOROLAC TROMETHAMINE 30 MG/ML
30 INJECTION, SOLUTION INTRAMUSCULAR; INTRAVENOUS EVERY 6 HOURS
Status: DISCONTINUED | OUTPATIENT
Start: 2023-05-15 | End: 2023-05-15

## 2023-05-15 RX ORDER — ALLOPURINOL 100 MG/1
100 TABLET ORAL 2 TIMES DAILY
Status: DISCONTINUED | OUTPATIENT
Start: 2023-05-16 | End: 2023-05-16 | Stop reason: HOSPADM

## 2023-05-15 RX ORDER — CEFAZOLIN SODIUM 2 G/50ML
2 SOLUTION INTRAVENOUS
Status: DISCONTINUED | OUTPATIENT
Start: 2023-05-15 | End: 2023-05-15

## 2023-05-15 RX ORDER — MIDAZOLAM HYDROCHLORIDE 1 MG/ML
2 INJECTION INTRAMUSCULAR; INTRAVENOUS ONCE AS NEEDED
Status: COMPLETED | OUTPATIENT
Start: 2023-05-15 | End: 2023-05-15

## 2023-05-15 RX ORDER — ACETAMINOPHEN 500 MG
1000 TABLET ORAL EVERY 8 HOURS
Status: DISCONTINUED | OUTPATIENT
Start: 2023-05-16 | End: 2023-05-16 | Stop reason: HOSPADM

## 2023-05-15 RX ORDER — SODIUM CHLORIDE, SODIUM LACTATE, POTASSIUM CHLORIDE, CALCIUM CHLORIDE 600; 310; 30; 20 MG/100ML; MG/100ML; MG/100ML; MG/100ML
INJECTION, SOLUTION INTRAVENOUS CONTINUOUS
Status: DISCONTINUED | OUTPATIENT
Start: 2023-05-15 | End: 2023-05-15

## 2023-05-15 RX ORDER — PANTOPRAZOLE SODIUM 40 MG/1
40 TABLET, DELAYED RELEASE ORAL DAILY
Status: DISCONTINUED | OUTPATIENT
Start: 2023-05-16 | End: 2023-05-16 | Stop reason: HOSPADM

## 2023-05-15 RX ORDER — SODIUM CHLORIDE 9 MG/ML
INJECTION, SOLUTION INTRAMUSCULAR; INTRAVENOUS; SUBCUTANEOUS
Status: DISCONTINUED | OUTPATIENT
Start: 2023-05-15 | End: 2023-05-15 | Stop reason: HOSPADM

## 2023-05-15 RX ORDER — IBUPROFEN 200 MG
15 TABLET ORAL
Status: DISCONTINUED | OUTPATIENT
Start: 2023-05-15 | End: 2023-05-16 | Stop reason: HOSPADM

## 2023-05-15 RX ORDER — LIDOCAINE HYDROCHLORIDE 10 MG/ML
1 INJECTION, SOLUTION EPIDURAL; INFILTRATION; INTRACAUDAL; PERINEURAL ONCE
Status: CANCELLED | OUTPATIENT
Start: 2023-05-15 | End: 2023-05-15

## 2023-05-15 RX ORDER — BUPIVACAINE HYDROCHLORIDE 2.5 MG/ML
INJECTION, SOLUTION EPIDURAL; INFILTRATION; INTRACAUDAL
Status: DISPENSED
Start: 2023-05-15 | End: 2023-05-15

## 2023-05-15 RX ORDER — ENOXAPARIN SODIUM 100 MG/ML
40 INJECTION SUBCUTANEOUS
Status: COMPLETED | OUTPATIENT
Start: 2023-05-15 | End: 2023-05-15

## 2023-05-15 RX ORDER — ACETAMINOPHEN 10 MG/ML
1000 INJECTION, SOLUTION INTRAVENOUS EVERY 8 HOURS
Status: DISCONTINUED | OUTPATIENT
Start: 2023-05-15 | End: 2023-05-15

## 2023-05-15 RX ORDER — EPHEDRINE SULFATE 50 MG/ML
INJECTION, SOLUTION INTRAVENOUS
Status: DISCONTINUED | OUTPATIENT
Start: 2023-05-15 | End: 2023-05-15

## 2023-05-15 RX ORDER — METOPROLOL SUCCINATE 25 MG/1
25 TABLET, EXTENDED RELEASE ORAL 2 TIMES DAILY
Status: DISCONTINUED | OUTPATIENT
Start: 2023-05-16 | End: 2023-05-16 | Stop reason: HOSPADM

## 2023-05-15 RX ORDER — ONDANSETRON 4 MG/1
4 TABLET, ORALLY DISINTEGRATING ORAL EVERY 6 HOURS PRN
Status: DISCONTINUED | OUTPATIENT
Start: 2023-05-16 | End: 2023-05-16 | Stop reason: HOSPADM

## 2023-05-15 RX ORDER — ACETAMINOPHEN 10 MG/ML
1000 INJECTION, SOLUTION INTRAVENOUS EVERY 8 HOURS
Status: COMPLETED | OUTPATIENT
Start: 2023-05-15 | End: 2023-05-16

## 2023-05-15 RX ORDER — SODIUM CHLORIDE, SODIUM GLUCONATE, SODIUM ACETATE, POTASSIUM CHLORIDE AND MAGNESIUM CHLORIDE 30; 37; 368; 526; 502 MG/100ML; MG/100ML; MG/100ML; MG/100ML; MG/100ML
INJECTION, SOLUTION INTRAVENOUS CONTINUOUS
Status: DISCONTINUED | OUTPATIENT
Start: 2023-05-15 | End: 2023-05-15

## 2023-05-15 RX ORDER — ASPIRIN 325 MG
81 TABLET ORAL DAILY
COMMUNITY
End: 2023-12-26

## 2023-05-15 RX ORDER — ACETAMINOPHEN 325 MG/1
650 TABLET ORAL EVERY 4 HOURS PRN
Status: CANCELLED | OUTPATIENT
Start: 2023-05-15

## 2023-05-15 RX ORDER — LORAZEPAM 2 MG/ML
0.5 INJECTION INTRAMUSCULAR EVERY 8 HOURS PRN
Status: ACTIVE | OUTPATIENT
Start: 2023-05-15 | End: 2023-05-16

## 2023-05-15 RX ORDER — ZOLPIDEM TARTRATE 5 MG/1
10 TABLET ORAL NIGHTLY
Status: DISCONTINUED | OUTPATIENT
Start: 2023-05-16 | End: 2023-05-16 | Stop reason: HOSPADM

## 2023-05-15 RX ORDER — ACETAMINOPHEN 325 MG/1
650 TABLET ORAL EVERY 4 HOURS PRN
Status: DISCONTINUED | OUTPATIENT
Start: 2023-05-15 | End: 2023-05-15

## 2023-05-15 RX ORDER — ENOXAPARIN SODIUM 100 MG/ML
40 INJECTION SUBCUTANEOUS DAILY
Status: DISCONTINUED | OUTPATIENT
Start: 2023-05-16 | End: 2023-05-16 | Stop reason: HOSPADM

## 2023-05-15 RX ORDER — GABAPENTIN 300 MG/1
600 CAPSULE ORAL ONCE
Status: COMPLETED | OUTPATIENT
Start: 2023-05-15 | End: 2023-05-15

## 2023-05-15 RX ORDER — LABETALOL HYDROCHLORIDE 5 MG/ML
10 INJECTION, SOLUTION INTRAVENOUS
Status: DISCONTINUED | OUTPATIENT
Start: 2023-05-15 | End: 2023-05-16 | Stop reason: HOSPADM

## 2023-05-15 RX ORDER — DIPHENHYDRAMINE HYDROCHLORIDE 50 MG/ML
25 INJECTION INTRAMUSCULAR; INTRAVENOUS ONCE
Status: DISCONTINUED | OUTPATIENT
Start: 2023-05-15 | End: 2023-05-15

## 2023-05-15 RX ORDER — SODIUM CHLORIDE 9 MG/ML
INJECTION, SOLUTION INTRAMUSCULAR; INTRAVENOUS; SUBCUTANEOUS
Status: DISPENSED
Start: 2023-05-15 | End: 2023-05-15

## 2023-05-15 RX ORDER — PROPOFOL 10 MG/ML
VIAL (ML) INTRAVENOUS
Status: DISCONTINUED | OUTPATIENT
Start: 2023-05-15 | End: 2023-05-15

## 2023-05-15 RX ORDER — HYDROCODONE BITARTRATE AND ACETAMINOPHEN 5; 325 MG/1; MG/1
1 TABLET ORAL EVERY 4 HOURS PRN
Status: CANCELLED | OUTPATIENT
Start: 2023-05-15

## 2023-05-15 RX ORDER — LISINOPRIL 5 MG/1
5 TABLET ORAL DAILY
Status: DISCONTINUED | OUTPATIENT
Start: 2023-05-16 | End: 2023-05-16 | Stop reason: HOSPADM

## 2023-05-15 RX ORDER — ONDANSETRON 4 MG/1
4 TABLET, ORALLY DISINTEGRATING ORAL ONCE
Status: CANCELLED | OUTPATIENT
Start: 2023-05-15 | End: 2023-05-15

## 2023-05-15 RX ORDER — SODIUM CHLORIDE, SODIUM LACTATE, POTASSIUM CHLORIDE, CALCIUM CHLORIDE 600; 310; 30; 20 MG/100ML; MG/100ML; MG/100ML; MG/100ML
INJECTION, SOLUTION INTRAVENOUS CONTINUOUS
Status: DISCONTINUED | OUTPATIENT
Start: 2023-05-15 | End: 2023-05-16 | Stop reason: HOSPADM

## 2023-05-15 RX ORDER — PROMETHAZINE HYDROCHLORIDE 12.5 MG/1
12.5 TABLET ORAL EVERY 6 HOURS PRN
Qty: 20 TABLET | Refills: 0 | Status: SHIPPED | OUTPATIENT
Start: 2023-05-15 | End: 2023-12-26

## 2023-05-15 RX ORDER — ONDANSETRON 4 MG/1
4 TABLET, ORALLY DISINTEGRATING ORAL ONCE
Status: COMPLETED | OUTPATIENT
Start: 2023-05-15 | End: 2023-05-15

## 2023-05-15 RX ORDER — ONDANSETRON 4 MG/1
4 TABLET, ORALLY DISINTEGRATING ORAL EVERY 6 HOURS PRN
Qty: 20 TABLET | Refills: 0 | Status: SHIPPED | OUTPATIENT
Start: 2023-05-16 | End: 2023-08-28

## 2023-05-15 RX ORDER — HYDROMORPHONE HYDROCHLORIDE 2 MG/ML
0.4 INJECTION, SOLUTION INTRAMUSCULAR; INTRAVENOUS; SUBCUTANEOUS EVERY 5 MIN PRN
Status: DISCONTINUED | OUTPATIENT
Start: 2023-05-15 | End: 2023-05-15

## 2023-05-15 RX ORDER — MIDAZOLAM HYDROCHLORIDE 1 MG/ML
2 INJECTION INTRAMUSCULAR; INTRAVENOUS ONCE AS NEEDED
Status: CANCELLED | OUTPATIENT
Start: 2023-05-15 | End: 2034-10-10

## 2023-05-15 RX ORDER — ROCURONIUM BROMIDE 10 MG/ML
INJECTION, SOLUTION INTRAVENOUS
Status: DISCONTINUED | OUTPATIENT
Start: 2023-05-15 | End: 2023-05-15

## 2023-05-15 RX ORDER — LIDOCAINE HYDROCHLORIDE 10 MG/ML
INJECTION, SOLUTION EPIDURAL; INFILTRATION; INTRACAUDAL; PERINEURAL
Status: DISCONTINUED | OUTPATIENT
Start: 2023-05-15 | End: 2023-05-15

## 2023-05-15 RX ORDER — SODIUM CHLORIDE, SODIUM LACTATE, POTASSIUM CHLORIDE, CALCIUM CHLORIDE 600; 310; 30; 20 MG/100ML; MG/100ML; MG/100ML; MG/100ML
INJECTION, SOLUTION INTRAVENOUS CONTINUOUS
Status: CANCELLED | OUTPATIENT
Start: 2023-05-15

## 2023-05-15 RX ORDER — SODIUM CHLORIDE, SODIUM GLUCONATE, SODIUM ACETATE, POTASSIUM CHLORIDE AND MAGNESIUM CHLORIDE 30; 37; 368; 526; 502 MG/100ML; MG/100ML; MG/100ML; MG/100ML; MG/100ML
INJECTION, SOLUTION INTRAVENOUS CONTINUOUS
Status: CANCELLED | OUTPATIENT
Start: 2023-05-15 | End: 2023-06-14

## 2023-05-15 RX ORDER — CEFAZOLIN 2 G/1
INJECTION, POWDER, FOR SOLUTION INTRAMUSCULAR; INTRAVENOUS
Status: COMPLETED
Start: 2023-05-15 | End: 2023-05-15

## 2023-05-15 RX ORDER — HYDRALAZINE HYDROCHLORIDE 20 MG/ML
INJECTION INTRAMUSCULAR; INTRAVENOUS
Status: COMPLETED
Start: 2023-05-15 | End: 2023-05-15

## 2023-05-15 RX ORDER — HYDRALAZINE HYDROCHLORIDE 20 MG/ML
10 INJECTION INTRAMUSCULAR; INTRAVENOUS
Status: DISCONTINUED | OUTPATIENT
Start: 2023-05-15 | End: 2023-05-15

## 2023-05-15 RX ORDER — CELECOXIB 200 MG/1
200 CAPSULE ORAL ONCE
Status: COMPLETED | OUTPATIENT
Start: 2023-05-15 | End: 2023-05-15

## 2023-05-15 RX ORDER — GLUCAGON 1 MG
1 KIT INJECTION
Status: DISCONTINUED | OUTPATIENT
Start: 2023-05-15 | End: 2023-05-16 | Stop reason: HOSPADM

## 2023-05-15 RX ORDER — MEPERIDINE HYDROCHLORIDE 25 MG/ML
12.5 INJECTION INTRAMUSCULAR; INTRAVENOUS; SUBCUTANEOUS ONCE
Status: DISCONTINUED | OUTPATIENT
Start: 2023-05-15 | End: 2023-05-15

## 2023-05-15 RX ORDER — TRAMADOL HYDROCHLORIDE 50 MG/1
100 TABLET ORAL EVERY 6 HOURS PRN
Status: DISCONTINUED | OUTPATIENT
Start: 2023-05-16 | End: 2023-05-16 | Stop reason: HOSPADM

## 2023-05-15 RX ORDER — ONDANSETRON 2 MG/ML
4 INJECTION INTRAMUSCULAR; INTRAVENOUS EVERY 4 HOURS PRN
Status: DISCONTINUED | OUTPATIENT
Start: 2023-05-15 | End: 2023-05-16 | Stop reason: HOSPADM

## 2023-05-15 RX ORDER — HYDROCODONE BITARTRATE AND ACETAMINOPHEN 5; 325 MG/1; MG/1
1 TABLET ORAL EVERY 4 HOURS PRN
Status: DISCONTINUED | OUTPATIENT
Start: 2023-05-15 | End: 2023-05-15

## 2023-05-15 RX ORDER — PROMETHAZINE HYDROCHLORIDE 12.5 MG/1
12.5 TABLET ORAL EVERY 6 HOURS PRN
Status: DISCONTINUED | OUTPATIENT
Start: 2023-05-15 | End: 2023-05-16 | Stop reason: HOSPADM

## 2023-05-15 RX ORDER — ACETAMINOPHEN 500 MG
1000 TABLET ORAL EVERY 8 HOURS
Qty: 18 TABLET | Refills: 0 | Status: SHIPPED | OUTPATIENT
Start: 2023-05-16 | End: 2023-05-19

## 2023-05-15 RX ORDER — PROCHLORPERAZINE EDISYLATE 5 MG/ML
5 INJECTION INTRAMUSCULAR; INTRAVENOUS EVERY 6 HOURS PRN
Status: DISCONTINUED | OUTPATIENT
Start: 2023-05-15 | End: 2023-05-16 | Stop reason: HOSPADM

## 2023-05-15 RX ORDER — INSULIN ASPART 100 [IU]/ML
1-10 INJECTION, SOLUTION INTRAVENOUS; SUBCUTANEOUS
Status: DISCONTINUED | OUTPATIENT
Start: 2023-05-15 | End: 2023-05-16 | Stop reason: HOSPADM

## 2023-05-15 RX ORDER — BUPIVACAINE HYDROCHLORIDE 2.5 MG/ML
INJECTION, SOLUTION EPIDURAL; INFILTRATION; INTRACAUDAL
Status: DISCONTINUED | OUTPATIENT
Start: 2023-05-15 | End: 2023-05-15 | Stop reason: HOSPADM

## 2023-05-15 RX ORDER — METHOCARBAMOL 100 MG/ML
1000 INJECTION, SOLUTION INTRAMUSCULAR; INTRAVENOUS ONCE
Status: COMPLETED | OUTPATIENT
Start: 2023-05-15 | End: 2023-05-15

## 2023-05-15 RX ORDER — CEFAZOLIN SODIUM 1 G/3ML
INJECTION, POWDER, FOR SOLUTION INTRAMUSCULAR; INTRAVENOUS
Status: DISCONTINUED | OUTPATIENT
Start: 2023-05-15 | End: 2023-05-15

## 2023-05-15 RX ORDER — LIDOCAINE HYDROCHLORIDE 10 MG/ML
1 INJECTION, SOLUTION EPIDURAL; INFILTRATION; INTRACAUDAL; PERINEURAL ONCE
Status: DISCONTINUED | OUTPATIENT
Start: 2023-05-15 | End: 2023-05-15

## 2023-05-15 RX ORDER — FENTANYL CITRATE 50 UG/ML
INJECTION, SOLUTION INTRAMUSCULAR; INTRAVENOUS
Status: DISCONTINUED | OUTPATIENT
Start: 2023-05-15 | End: 2023-05-15

## 2023-05-15 RX ORDER — IBUPROFEN 200 MG
24 TABLET ORAL
Status: DISCONTINUED | OUTPATIENT
Start: 2023-05-15 | End: 2023-05-16 | Stop reason: HOSPADM

## 2023-05-15 RX ADMIN — CLONIDINE 1 PATCH: 0.1 PATCH TRANSDERMAL at 10:05

## 2023-05-15 RX ADMIN — PROPOFOL 150 MG: 10 INJECTION, EMULSION INTRAVENOUS at 08:05

## 2023-05-15 RX ADMIN — LIDOCAINE HYDROCHLORIDE 50 MG: 10 INJECTION, SOLUTION EPIDURAL; INFILTRATION; INTRACAUDAL; PERINEURAL at 08:05

## 2023-05-15 RX ADMIN — LABETALOL HYDROCHLORIDE 10 MG: 5 INJECTION, SOLUTION INTRAVENOUS at 12:05

## 2023-05-15 RX ADMIN — SUGAMMADEX 200 MG: 100 INJECTION, SOLUTION INTRAVENOUS at 09:05

## 2023-05-15 RX ADMIN — KETOROLAC TROMETHAMINE 30 MG: 30 INJECTION, SOLUTION INTRAMUSCULAR; INTRAVENOUS at 01:05

## 2023-05-15 RX ADMIN — KETOROLAC TROMETHAMINE 30 MG: 30 INJECTION, SOLUTION INTRAMUSCULAR; INTRAVENOUS at 06:05

## 2023-05-15 RX ADMIN — SODIUM CHLORIDE, POTASSIUM CHLORIDE, SODIUM LACTATE AND CALCIUM CHLORIDE: 600; 310; 30; 20 INJECTION, SOLUTION INTRAVENOUS at 04:05

## 2023-05-15 RX ADMIN — HYDRALAZINE HYDROCHLORIDE 10 MG: 20 INJECTION INTRAMUSCULAR; INTRAVENOUS at 09:05

## 2023-05-15 RX ADMIN — ACETAMINOPHEN 1000 MG: 500 TABLET, FILM COATED ORAL at 07:05

## 2023-05-15 RX ADMIN — INSULIN ASPART 2 UNITS: 100 INJECTION, SOLUTION INTRAVENOUS; SUBCUTANEOUS at 04:05

## 2023-05-15 RX ADMIN — HYDROMORPHONE HYDROCHLORIDE 0.4 MG: 2 INJECTION, SOLUTION INTRAMUSCULAR; INTRAVENOUS; SUBCUTANEOUS at 10:05

## 2023-05-15 RX ADMIN — ROCURONIUM BROMIDE 50 MG: 50 INJECTION INTRAVENOUS at 08:05

## 2023-05-15 RX ADMIN — GABAPENTIN 600 MG: 300 CAPSULE ORAL at 07:05

## 2023-05-15 RX ADMIN — MIDAZOLAM HYDROCHLORIDE 2 MG: 1 INJECTION, SOLUTION INTRAMUSCULAR; INTRAVENOUS at 07:05

## 2023-05-15 RX ADMIN — CELECOXIB 200 MG: 200 CAPSULE ORAL at 07:05

## 2023-05-15 RX ADMIN — EPHEDRINE SULFATE 25 MG: 50 INJECTION INTRAVENOUS at 09:05

## 2023-05-15 RX ADMIN — CEFAZOLIN 2 G: 330 INJECTION, POWDER, FOR SOLUTION INTRAMUSCULAR; INTRAVENOUS at 08:05

## 2023-05-15 RX ADMIN — ONDANSETRON 4 MG: 4 TABLET, ORALLY DISINTEGRATING ORAL at 07:05

## 2023-05-15 RX ADMIN — ENOXAPARIN SODIUM 40 MG: 40 INJECTION SUBCUTANEOUS at 07:05

## 2023-05-15 RX ADMIN — HYDRALAZINE HYDROCHLORIDE 10 MG: 20 INJECTION INTRAMUSCULAR; INTRAVENOUS at 10:05

## 2023-05-15 RX ADMIN — SODIUM CHLORIDE, SODIUM LACTATE, POTASSIUM CHLORIDE, CALCIUM CHLORIDE: 600; 310; 30; 20 INJECTION, SOLUTION INTRAVENOUS at 08:05

## 2023-05-15 RX ADMIN — ACETAMINOPHEN 1000 MG: 10 INJECTION, SOLUTION INTRAVENOUS at 11:05

## 2023-05-15 RX ADMIN — SODIUM CHLORIDE, POTASSIUM CHLORIDE, SODIUM LACTATE AND CALCIUM CHLORIDE: 600; 310; 30; 20 INJECTION, SOLUTION INTRAVENOUS at 10:05

## 2023-05-15 RX ADMIN — METHOCARBAMOL 1000 MG: 100 INJECTION INTRAMUSCULAR; INTRAVENOUS at 09:05

## 2023-05-15 RX ADMIN — ACETAMINOPHEN 1000 MG: 10 INJECTION, SOLUTION INTRAVENOUS at 03:05

## 2023-05-15 RX ADMIN — FENTANYL CITRATE 50 MCG: 50 INJECTION, SOLUTION INTRAMUSCULAR; INTRAVENOUS at 09:05

## 2023-05-15 RX ADMIN — SODIUM CHLORIDE, POTASSIUM CHLORIDE, SODIUM LACTATE AND CALCIUM CHLORIDE: 600; 310; 30; 20 INJECTION, SOLUTION INTRAVENOUS at 07:05

## 2023-05-15 RX ADMIN — FENTANYL CITRATE 50 MCG: 50 INJECTION, SOLUTION INTRAMUSCULAR; INTRAVENOUS at 08:05

## 2023-05-15 NOTE — ANESTHESIA PREPROCEDURE EVALUATION
05/14/2023  Da Edwards is a 58 y.o., male.  Case: [id number] Date/Time: 05/15/23 0800   Procedure: GASTRECTOMY, SLEEVE, LAPAROSCOPIC  **SELF PAY** (Abdomen)   Anesthesia type: General   Diagnosis: Morbid obesity [E66.01]   Pre-op diagnosis: Morbid obesity [E66.01]   Location: [facility] OR [address] [place] [address] OR   Surgeons: Dm Tomas MD       Pre-op Assessment    I have reviewed the Patient Summary Reports.     I have reviewed the Nursing Notes. I have reviewed the NPO Status.   I have reviewed the Medications.     Review of Systems  Anesthesia Hx:  No problems with previous Anesthesia    Hematology/Oncology:  Hematology Normal   Oncology Normal     EENT/Dental:EENT/Dental Normal   Cardiovascular:   Exercise tolerance: good Hypertension  Functional Capacity good / => 4 METS    Pulmonary:   Sleep Apnea    Renal/:   Denies Chronic Renal Disease.     Hepatic/GI:  Hepatic/GI Normal    Musculoskeletal:  Musculoskeletal Normal    Neurological:  Neurology Normal    Endocrine:   Diabetes  Morbid Obesity / BMI > 40  Dermatological:  Skin Normal    Psych:   Psychiatric History anxiety          Physical Exam  General: Alert, Oriented, Well nourished and Cooperative    Airway:  Mallampati: II / III  Mouth Opening: Normal  TM Distance: Normal  Tongue: Normal  Neck ROM: Normal ROM    Dental:  Intact    Chest/Lungs:  Clear to auscultation, Normal Respiratory Rate    Heart:  Rate: Normal  Rhythm: Regular Rhythm       Latest Reference Range & Units Most Recent   WBC 4.5 - 11.5 x10(3)/mcL 5.4  4/24/23 10:32   RBC 4.70 - 6.10 x10(6)/mcL 5.23  4/24/23 10:32   Hemoglobin 14.0 - 18.0 g/dL 14.2  4/24/23 10:32   Hematocrit 42.0 - 52.0 % 42.7  4/24/23 10:32   MCV 80.0 - 94.0 fL 81.6  4/24/23 10:32   MCH 27.0 - 31.0 pg 27.2  4/24/23 10:32   MCHC 33.0 - 36.0 g/dL 33.3  4/24/23 10:32   RDW 11.5 - 17.0 % 13.2  4/24/23 10:32   Platelets 130 -  400 x10(3)/mcL 179  4/24/23 10:32   MPV 7.4 - 10.4 fL 9.6  4/24/23 10:32   Neut % % 61.3  4/24/23 10:32   LYMPH % % 31.4  4/24/23 10:32   Mono % % 7.3  4/24/23 10:32   Neut # 2.1 - 9.2 x10(3)/mcL 3.3  4/24/23 10:32   Lymph # 0.6 - 4.6 x10(3)/mcL 1.7  4/24/23 10:32   Mono # 0.1 - 1.3 x10(3)/mcL 0.4  4/24/23 10:32   aPTT 23.2 - 33.7 seconds 28.3  4/24/23 10:32   Sodium 136 - 145 mmol/L 140  4/24/23 10:32   Potassium 3.5 - 5.1 mmol/L 4.6  4/24/23 10:32   Chloride mmol/L 102  4/24/23 10:32   CO2 21 - 32 mmol/L 31  4/24/23 10:32   BUN 7.0 - 18.0 mg/dL 20.0 (H)  4/24/23 10:32   Creatinine 0.60 - 1.30 mg/dL 1.02  4/24/23 10:32   eGFR mls/min/1.73/m2 >60  4/24/23 10:32   eGFR if non African American mls/min/1.73/m2 >60  5/4/22 09:58   eGFR if African American mL/min/1.73 m2 >60  7/30/19 10:04   Glucose 74 - 106 mg/dL 158 (H)  4/24/23 10:32   Calcium 8.5 - 10.1 mg/dL 9.7  4/24/23 10:32   Alkaline Phosphatase 38 - 126 unit/L 88  4/24/23 10:32   PROTEIN TOTAL 6.4 - 8.2 gm/dL 6.6  4/24/23 10:32   Albumin 3.4 - 5.0 g/dL 4.2  4/24/23 10:32   Albumin/Globulin Ratio 1.5 - 2.0 ratio 1.8  4/24/23 10:32   Uric Acid 2.6 - 7.2 mg/dL 6.5  5/9/23 11:54   BILIRUBIN TOTAL 0.2 - 1.0 mg/dL 0.7  4/24/23 10:32   Bilirubin Direct 0.0 - 0.5 mg/dL 0.2  4/24/23 10:32   Bilirubin, Indirect mg/dL 0.50  5/4/22 09:58   AST 15 - 37 unit/L 23  4/24/23 10:32   ALT 7 - 52 unit/L 27  4/24/23 10:32   Globulin, Total 1.2 - 3.0 gm/dL 2.4  4/24/23 10:32   Cholesterol 0 - 200 mg/dL 125  4/24/23 10:32   HDL 35 - 60 mg/dL 46  4/24/23 10:32   LDL Cholesterol External 0.00 - 129.00 mg/dL 57.00  5/4/22 09:58   LDL Cholesterol Direct <=129.0 mg/dL 67.0  4/24/23 10:32   Total Cholesterol/HDL Ratio  3  11/15/22 11:41   Triglycerides 30 - 150 mg/dL 66  4/24/23 10:32   Very Low Density Lipoprotein  13  11/15/22 11:41   Thyroid Stimulating Hormone 0.3500 - 4.9400 uIU/mL 1.4577  11/15/22 11:38   Hemoglobin A1C External % 6.8  4/24/23 10:32   Estimated Avg Glucose mg/dL  148.5  4/24/23 10:32   PSA, Screen 0.00 - 3.50 ng/mL 0.49  12/23/22 08:51   Testosterone 300.00 - 1,060.00 ng/dL 697.28  11/15/22 11:41   Hepatitis C Ab Nonreactive  Nonreactive  12/23/22 08:51   HIV Nonreactive  Nonreactive  12/23/22 08:51   Creatinine, Urine mg/dL 300.0  12/23/22 08:51   Urine Microalbumin mg/L 80.0  12/23/22 08:51   MICROALB/CREAT RATIO mg/gm Cr 26.7  12/23/22 08:51   POCT Glucose 70 - 110 mg/dL 124 (H)  3/17/23 12:53   PSA  0.57 (E)  6/3/21 00:00   Est Creat Clearance Ser mL/min 140.41  1/4/22 12:36   H. PYLORI AB ([facility]) Negative  Negative  1/26/23 09:55   Left Eye DM Retinopathy  Positive (E)  9/13/22 00:00   Right Eye DM Retinopathy  Positive (E)  9/13/22 00:00   (H): Data is abnormally high  (E): External lab result    Anesthesia Plan  Type of Anesthesia, risks & benefits discussed:    Anesthesia Type: Gen ETT  Intra-op Monitoring Plan: Standard ASA Monitors  Post Op Pain Control Plan: multimodal analgesia  Induction:  IV and Inhalation  Airway Plan: Direct  Informed Consent: Informed consent signed with the Patient and all parties understand the risks and agree with anesthesia plan.  All questions answered. Patient consented to blood products? Yes  ASA Score: 3  Day of Surgery Review of History & Physical: H&P Update referred to the surgeon/provider.I have interviewed and examined the patient. I have reviewed the patient's H&P dated: There are no significant changes.     Ready For Surgery From Anesthesia Perspective.     .

## 2023-05-15 NOTE — PROGRESS NOTES
Op site dressing noted x 6 trocar sites, surrounding area soft and warm to the touch, color WDL. Abd. Binder in place

## 2023-05-15 NOTE — DISCHARGE INSTRUCTIONS
HOSPITAL DISCHARGE INSTRUCTIONS    Clinic Phone Numbers       Surgeons office number and after hours on call surgeon: [phone].    ALWAYS call the surgeons office PRIOR to going to an Urgent Care or the emergency room. If medical emergency, call 911.     Signs and Symptoms that would warrant a phone call of the office (regardless of the time of day):     Fever greater than 101 F     Uncontrolled pain that does not improve with pain medication     Uncontrolled nausea that does not improve with nausea medication      Vomiting     Shortness of breath     Chest Pain     Foul smelling drainage from incision and/or yellow or green drainage from incision     Red, hot painful incisions     Bloody bowel movements     ** If you feel as though it is a life-threatening emergency, call 911 and go to the emergency room**          Prescriptions     Medications     Pain medication (if needed) Tylenol over the counter is safe to take for discomfort     Anti-nausea medication (if needed)     Proton Pump Inhibitor (finish all 30 days), call [phone] if you did not receive 30 days of medication       Supplements     Chewable multivitamin- take 2 times a day (unless otherwise directed)     Chewable Calcium Citrate with D3- take 3 times a day (unless otherwise directed)     Iron tablet- take once daily      MiraLAX- take once daily for 2 weeks       Home Medications     Please review your medication list that you received at pre-op class as well as at the hospital instructions upon discharge to assure you are resuming all medications that are deemed  safe after surgery.      ** REMINDER- You should have scheduled your follow-up with your prescribing doctor for 1-week post-op**          Appointments     Surgeons Post-op Visits- please review orange sheet you received in the mail after surgery. Call [phone] if you need to reschedule your surgeons post-op visit.      Bariatric Team Post-op Visits- please review blue sheet  you received in your e-mail or please reference [facility] for your post-op appointments. . Call [phone] option 1 if you need to reschedule your bariatric team post-op visit.          Nutritional Considerations     Hydration is CRITICAL!     Daily fluid intake of  oz water     Water is more important than protein      Review list of allowable and non-allowable liquids in your Bariatric Booklet    The only fluids that count towards your water goal is water, sugar free, caffeine free flavored water        Diet progression          Continue the dietary protocol until you meet with the dietitian at your 2-week visit     Strive to reach protein goal. Only liquids counted toward your protein goal are protein shake, milk and approved yogurt     It is MANDATORY that you do not progress your diet without speaking to the dietitian to prevent potential complications          Incisional Care     Wash your hands before you touch your incisions or dressings     Remove any gauze or dressing over your incision. ONLY steri-strips (butter-fly band aids) should remain over your incision     Shower daily with an anti-bacterial soap (Hibiclens or Dial, orange bar).      Allow water to hit your back in the shower     Wet the incisions with water     Apply soap to a clean washcloth and wash over your incisions (do not scrub)     Rinse your incision with water and pat dry with a clean towel      Check your incisions daily for any redness, swelling, hot to touch, or bright red, green or yellow drainage.             Dark red, dried blood, indention of an incision, bruising may appear under the steri-strips. This is normal.     Do not apply any creams, ointments, etc. on the incisions.      Leave incision open to air (unless instructed otherwise)          Activity     Walk and/or ride a stationary bike 20 minutes a day     Do not lift, pull or push anything greater than 10 pounds for 4-6 weeks     Do not go the gym until you are  4-6 weeks post-op     Use your incentive spirometer (breathing machine) 10 x an hour for 1-2 weeks     Shower daily, DO NOT submerge yourself in water until 2 weeks post-op and cleared by surgeon          Post-Operative Expectations     A soreness is to be expected. Pain differs for everyone. Please refer to the pain scale and list of when to call the doctor regarding pain.     Nausea can last a few weeks after surgery due to the body getting adjusted to the new small stomach. Take anti-nausea as needed and stay HYDRATED. Slight dehydration will cause nausea.     Constipation is common after surgery. Take MiraLAX daily even if your bowel movements are regular. Please refer to the FAQs regarding constipation. Water is essential in preventing constipation.         Constipation after Bariatric Surgery  The Basics     ***Due to the change in your diet just prior to surgery and immediately after surgery, it is very common to have a change in your bowel movements in the immediate post op period. It is completely normal to not have a bowel movement everyday in the first few weeks after bariatric surgery due to decreased oral intake. It is common not to have your first bowel movement for 4-5 days after surgery. If you don't have your first bowel movement 7 days after surgery, please contact surgeon's office to discuss.     What is constipation? -- Constipation is a common problem that makes it hard to have bowel movements. Your bowel movements might be:  Too hard  Too small  Hard to get out  Happening fewer than 3 times a week  What causes constipation after bariatric surgery? -- Constipation can be caused by:  High protein diet and decrease in water intake after bariatric surgery  What other symptoms should I watch for? -- These symptoms could signal a more serious problem:  Blood in the toilet or on the toilet paper after having a bowel movement  Fever  Feeling weak  It could also be a sign of a problem if you have new  constipation without a change in your medicines or diet, and have never had constipation in the past.   Is there anything I can do on my own to get rid of constipation? -- Yes. Try these steps:  Begin your MiraLAX the first day at home and continue it everyday if you are having normal soft bowel movement, even after the two weeks.   Please call the office [phone] if you do not have a bowel movement within 5 days of surgery.   Okay to take over the counter stool softeners once to twice per day, in addition to daily Miralax, if needed to help with post op constipation.   If you begin to have mutliple loose bowel movements (more than 3 per day), discontinue the stool softeners and Miralax.   If you continue to have multiple loose bowel movements per day (more than 5 loose bowel movements per day for more than 2 days in a row) after you have quit taking stool softeners and Miralax, contact surgeon's office to discuss this.  [phone]

## 2023-05-15 NOTE — TRANSFER OF CARE
"Anesthesia Transfer of Care Note    Patient: Da Edwards    Procedure(s) Performed: Procedure(s) (LRB):  GASTRECTOMY, SLEEVE, LAPAROSCOPIC  **SELF PAY** (N/A)    Patient location: PACU    Anesthesia Type: general    Transport from OR: Transported from OR on room air with adequate spontaneous ventilation    Post pain: adequate analgesia    Post assessment: no apparent anesthetic complications    Post vital signs: stable    Level of consciousness: responds to stimulation    Nausea/Vomiting: no nausea/vomiting    Complications: none    Transfer of care protocol was followed      Last vitals:   Visit Vitals  BP (!) 166/83   Pulse (!) 58   Temp 37.1 °C (98.7 °F) (Oral)   Resp 18   Ht 5' 5" (1.651 m)   Wt 114.8 kg (253 lb 1.4 oz)   SpO2 99%   BMI 42.12 kg/m²     "

## 2023-05-15 NOTE — ANESTHESIA PROCEDURE NOTES
Intubation    Date/Time: 5/15/2023 8:38 AM  Performed by: Osbaldo Bernardo CRNA  Authorized by: Long Hein MD     Intubation:     Induction:  Intravenous    Intubated:  Postinduction    Mask Ventilation:  Easy mask    Attempts:  1    Attempted By:  CRNA    Method of Intubation:  Direct    Blade:  Glidescope 3    Laryngeal View Grade: Grade IIA - cords partially seen      Difficult Airway Encountered?: No      Complications:  None    Airway Device:  Oral endotracheal tube    Airway Device Size:  7.5    Style/Cuff Inflation:  Cuffed (inflated to minimal occlusive pressure)    Tube secured:  23    Secured at:  The lips    Placement Verified By:  Capnometry    Complicating Factors:  None    Findings Post-Intubation:  BS equal bilateral and atraumatic/condition of teeth unchanged

## 2023-05-15 NOTE — ANESTHESIA POSTPROCEDURE EVALUATION
Anesthesia Post Evaluation    Patient: Da Edwards    Procedure(s) Performed: Procedure(s) (LRB):  GASTRECTOMY, SLEEVE, LAPAROSCOPIC  **SELF PAY** (N/A)    Final Anesthesia Type: general      Patient location during evaluation: PACU  Patient participation: Yes- Able to Participate  Level of consciousness: awake and alert and oriented  Post-procedure vital signs: reviewed and stable  Pain management: adequate  Airway patency: patent  LUCI mitigation strategies: Verification of full reversal of neuromuscular block  PONV status at discharge: No PONV  Anesthetic complications: no      Cardiovascular status: blood pressure returned to baseline and stable  Respiratory status: spontaneous ventilation and unassisted  Hydration status: euvolemic  Follow-up not needed.  Comments: [facility]          Vitals Value Taken Time   /71 05/15/23 1249   Temp 36.4 °C (97.6 °F) 05/15/23 1236   Pulse 61 05/15/23 1249   Resp 16 05/15/23 1045   SpO2 97 % 05/15/23 1249         Event Time   Out of Recovery 10:49:00         Pain/Maycol Score: Pain Rating Prior to Med Admin: 6 (5/15/2023  1:20 PM)  Pain Rating Post Med Admin: 4 (5/15/2023  2:00 PM)  Maycol Score: 9 (5/15/2023 10:50 AM)

## 2023-05-16 LAB
ANION GAP SERPL CALC-SCNC: 10 MEQ/L
BASOPHILS # BLD AUTO: 0.02 X10(3)/MCL
BASOPHILS NFR BLD AUTO: 0.2 %
BUN SERPL-MCNC: 12.5 MG/DL (ref 8.4–25.7)
CALCIUM SERPL-MCNC: 9.2 MG/DL (ref 8.4–10.2)
CHLORIDE SERPL-SCNC: 106 MMOL/L (ref 98–107)
CO2 SERPL-SCNC: 25 MMOL/L (ref 22–29)
CREAT SERPL-MCNC: 1.1 MG/DL (ref 0.73–1.18)
CREAT/UREA NIT SERPL: 11
EOSINOPHIL # BLD AUTO: 0.04 X10(3)/MCL (ref 0–0.9)
EOSINOPHIL NFR BLD AUTO: 0.4 %
ERYTHROCYTE [DISTWIDTH] IN BLOOD BY AUTOMATED COUNT: 13.1 % (ref 11.5–17)
GFR SERPLBLD CREATININE-BSD FMLA CKD-EPI: >60 MLS/MIN/1.73/M2
GLUCOSE SERPL-MCNC: 159 MG/DL (ref 74–100)
GLUCOSE SERPL-MCNC: 165 MG/DL (ref 70–110)
HCT VFR BLD AUTO: 42.8 % (ref 42–52)
HGB BLD-MCNC: 14.1 G/DL (ref 14–18)
IMM GRANULOCYTES # BLD AUTO: 0.04 X10(3)/MCL (ref 0–0.04)
IMM GRANULOCYTES NFR BLD AUTO: 0.4 %
LYMPHOCYTES # BLD AUTO: 2.08 X10(3)/MCL (ref 0.6–4.6)
LYMPHOCYTES NFR BLD AUTO: 20 %
MCH RBC QN AUTO: 27.3 PG (ref 27–31)
MCHC RBC AUTO-ENTMCNC: 32.9 G/DL (ref 33–36)
MCV RBC AUTO: 82.8 FL (ref 80–94)
MONOCYTES # BLD AUTO: 0.7 X10(3)/MCL (ref 0.1–1.3)
MONOCYTES NFR BLD AUTO: 6.7 %
NEUTROPHILS # BLD AUTO: 7.53 X10(3)/MCL (ref 2.1–9.2)
NEUTROPHILS NFR BLD AUTO: 72.3 %
NRBC BLD AUTO-RTO: 0 %
PLATELET # BLD AUTO: 180 X10(3)/MCL (ref 130–400)
PMV BLD AUTO: 9.5 FL (ref 7.4–10.4)
POCT GLUCOSE: 145 MG/DL (ref 70–110)
POCT GLUCOSE: 165 MG/DL (ref 70–110)
POTASSIUM SERPL-SCNC: 4.4 MMOL/L (ref 3.5–5.1)
PSYCHE PATHOLOGY RESULT: NORMAL
RBC # BLD AUTO: 5.17 X10(6)/MCL (ref 4.7–6.1)
SODIUM SERPL-SCNC: 141 MMOL/L (ref 136–145)
WBC # SPEC AUTO: 10.41 X10(3)/MCL (ref 4.5–11.5)

## 2023-05-16 PROCEDURE — 63600175 PHARM REV CODE 636 W HCPCS: Performed by: SURGERY

## 2023-05-16 PROCEDURE — 25000003 PHARM REV CODE 250: Performed by: NURSE PRACTITIONER

## 2023-05-16 PROCEDURE — 63600175 PHARM REV CODE 636 W HCPCS: Performed by: NURSE PRACTITIONER

## 2023-05-16 PROCEDURE — 80048 BASIC METABOLIC PNL TOTAL CA: CPT | Performed by: NURSE PRACTITIONER

## 2023-05-16 PROCEDURE — 85025 COMPLETE CBC W/AUTO DIFF WBC: CPT | Performed by: NURSE PRACTITIONER

## 2023-05-16 RX ADMIN — ENOXAPARIN SODIUM 40 MG: 40 INJECTION SUBCUTANEOUS at 08:05

## 2023-05-16 RX ADMIN — KETOROLAC TROMETHAMINE 30 MG: 30 INJECTION, SOLUTION INTRAMUSCULAR; INTRAVENOUS at 06:05

## 2023-05-16 RX ADMIN — METOPROLOL SUCCINATE 12.5 MG: 25 TABLET, EXTENDED RELEASE ORAL at 08:05

## 2023-05-16 RX ADMIN — INSULIN ASPART 2 UNITS: 100 INJECTION, SOLUTION INTRAVENOUS; SUBCUTANEOUS at 04:05

## 2023-05-16 RX ADMIN — PANTOPRAZOLE SODIUM 40 MG: 40 TABLET, DELAYED RELEASE ORAL at 08:05

## 2023-05-16 RX ADMIN — SODIUM CHLORIDE, POTASSIUM CHLORIDE, SODIUM LACTATE AND CALCIUM CHLORIDE: 600; 310; 30; 20 INJECTION, SOLUTION INTRAVENOUS at 12:05

## 2023-05-16 RX ADMIN — ACETAMINOPHEN 1000 MG: 10 INJECTION, SOLUTION INTRAVENOUS at 06:05

## 2023-05-16 RX ADMIN — SODIUM CHLORIDE, POTASSIUM CHLORIDE, SODIUM LACTATE AND CALCIUM CHLORIDE: 600; 310; 30; 20 INJECTION, SOLUTION INTRAVENOUS at 08:05

## 2023-05-16 RX ADMIN — KETOROLAC TROMETHAMINE 30 MG: 30 INJECTION, SOLUTION INTRAMUSCULAR; INTRAVENOUS at 12:05

## 2023-05-16 NOTE — OP NOTE
[facility] Surgical - Med Surg  Surgery Department  Operative Note    SUMMARY     Date of Procedure: 5/15/2023     Procedure: Procedure(s) (LRB):  GASTRECTOMY, SLEEVE, LAPAROSCOPIC  **SELF PAY** (N/A)     Surgeon(s) and Role:     * Dm Tomas MD - Primary     * David Austin Jr., MD - Assisting        Pre-Operative Diagnosis: Morbid obesity [E66.01]    Post-Operative Diagnosis: Post-Op Diagnosis Codes:     * Morbid obesity [E66.01]    Anesthesia: General    Operative Findings (including complications, if any):  Fatty liver    Description of Technical Procedures: Patient was taken to the operating room. After the induction of anesthesia the abdomen was prepped and draped in the usual sterile fashion. An optical trocar was used to access the peritoneum. Pneumoperitoneum was completed under direct vision. Additional working ports were placed under direct vision. A liver retractor was placed. The patient had no hiatal hernia defect.   The pylorus was identified and 4 cm proximal to the pylorus the greater curvature of the stomach was mobilized with Harmoinc scapel in the  direction of  the fundus and angle of His. The GE junction was identified the left sosa was identified.  A 34F blunt-tip bougie was placed  in the stomach under direct vision towards the pylorus along the lesser curve Sequential firings of green and blue linear cutter stapler were used to create the antral pouch and the sleeve portion sleeve gastrectomy. Attention was paid to not encroach upon the angular incisura, or the GE junction. The staple line was intact and hemostatic. Fibrin glue product was used on the staple line the bougie was removed  the liver retractor was removed under direct vision specimen was removed fascia closed 0 Vicryl skin closed 4-0 Vicryl patient tolerated procedure well.      Estimated Blood Loss (EBL): * No values recorded between 5/15/2023  8:55 AM and 5/15/2023  9:42 AM *             Specimens:   Specimen  (24h ago, onward)       Start     Ordered    05/15/23 0908  Specimen to Pathology  RELEASE UPON ORDERING        Comments: Specimen A: Product of sleeve gastrectomy     References:    Click here for ordering Quick Tip   Question:  Release to patient  Answer:  Immediate    05/15/23 0908                            Condition: Good    Disposition: PACU - hemodynamically stable.    Attestation: I was present and scrubbed for the entire procedure.

## 2023-05-16 NOTE — DISCHARGE SUMMARY
[facility] - Med Surg  General Surgery  Discharge Summary      Patient Name: Da Edwards  MRN: [id number]  Admission Date: 5/15/2023  Hospital Length of Stay: 1 days  Discharge Date and Time:  05/16/2023 10:25 AM  Attending Physician: Dm Tomas MD   Discharging Provider: GLEN Loyd  Primary Care Provider: Anil Arambula MD     Admit Diagnosis:   Morbid Obesity    Discharge Diagnosis:   Morbid Obesity    Operations During Hospitalization: Laparoscopic Vertical Sleeve Gastrectomy 5/15/23 Dr. Tomas    Hospital Course: 58 yr old female admitted to [facility] for an elective bariatric procedure. Procedure performed as stated above. Upon completion of procedure, pt was transferred from the recovery room to the post surgical floor for further care and treatment. POD#1, afebrile, vital signs stable. The pt's diet was advanced to bariatric clear liquids.     Review of Systems: Mild incisional pain reported but tolerable. Some gas pain in chest and in between shoulder blades. No nausea, vomiting, dysphagia, GERD. Patient ambulating in the room/hallway and voiding without difficulty. Pt denies any dizziness, palpitations, SOB, or CP. All other review of systems are negative.     Physical Examination:   Vital signs: stable, noted in chart  General: Awake and alert, able to answer all questions. Resting in bed with family member at bedside  Respiratory:  Clear to auscultation bilaterally  Cardio: Regular rate and rhythm.  Abdomen: Soft, non distended. Bowel sounds present to all 4 quadrants. Lap sites clean, dry, and intact. Abdomen benign.     Neuro: Alert and oriented x's 4.    Labs:  Unremarkable, see chart    Discharge Medication:   Protonix  Zofran  Phenergan  Tylenol, OTC  Toradol    Condition: Satisfactory    Disposition:  To home.   -Pt to f/u with Dr. Tomas in two weeks  -Continue IS at home  -Walk 20min daily   -Continue bariatric clears throughout today and progress dietary  protocol as instructed by dietitian tomorrow upon waking up at home  -Monitor CBGs and BP while at home and contact surgeons office or PCP if any issues     Consults:   Consults (From admission, onward)          Status Ordering Provider     Inpatient consult to Registered Dietitian/Nutritionist  Once        Provider:  (Not yet assigned)    Completed MOE TORRES            Significant Diagnostic Studies: Labs: BMP:   Recent Labs   Lab 05/16/23  0355      K 4.4   CO2 25   BUN 12.5   CREATININE 1.10   CALCIUM 9.2    and CBC   Recent Labs   Lab 05/16/23  0355   WBC 10.41   HGB 14.1   HCT 42.8          Pending Diagnostic Studies:       Procedure Component Value Units Date/Time    Specimen to Pathology [[id number]] Collected: 05/15/23 0901    Order Status: Sent Lab Status: In process Updated: 05/15/23 1023    Specimen: Tissue from Stomach           Final Active Diagnoses:    Diagnosis Date Noted POA    PRINCIPAL PROBLEM:  Morbid obesity with BMI of 40.0-44.9, adult [E66.01, Z68.41] 08/30/2022 Not Applicable      Problems Resolved During this Admission:      Discharged Condition: good    Disposition: Home or Self Care    Follow Up:   Follow-up Information       Dm Tomas MD Follow up on 6/1/2023.    Specialty: General Surgery  Why: @ 10:15 am  Contact information:  [address] [address] [phone]                           Patient Instructions:   No discharge procedures on file.  Medications:  Reconciled Home Medications:      Medication List        START taking these medications      acetaminophen 500 MG tablet  Commonly known as: TYLENOL  Take 2 tablets (1,000 mg total) by mouth every 8 (eight) hours. for 3 days     ketorolac 10 mg tablet  Commonly known as: TORADOL  Take 1 tablet (10 mg total) by mouth every 6 (six) hours. for 3 days     ondansetron 4 MG Tbdl  Commonly known as: ZOFRAN-ODT  Take 1 tablet (4 mg total) by mouth every 6 (six) hours as needed (first line  "med prn NV).     promethazine 12.5 MG Tab  Commonly known as: PHENERGAN  Take 1 tablet (12.5 mg total) by mouth every 6 (six) hours as needed (Second line prn NV).            CHANGE how you take these medications      pantoprazole 40 MG tablet  Commonly known as: PROTONIX  Take 1 tablet (40 mg total) by mouth once daily. Take once daily for first 30 days post op  What changed:   when to take this  additional instructions            CONTINUE taking these medications      allopurinoL 100 MG tablet  Commonly known as: ZYLOPRIM  TAKE ONE TABLET BY MOUTH TWICE DAILY     aspirin 325 MG tablet  Take 325 mg by mouth once daily.     HYPODERMIC NEEDLES 23 gauge x 1" Ndle  Generic drug: needle (disp) 23 gauge  USE WITH INJECTIONS EVERY 2 WEEKS     lisinopriL 5 MG tablet  Commonly known as: PRINIVIL,ZESTRIL  TAKE ONE TABLET BY MOUTH ONCE DAILY     metFORMIN 500 MG ER 24hr tablet  Commonly known as: GLUCOPHAGE-XR  Take 1 tab in the morning and 2 at night     metoprolol succinate 50 MG 24 hr tablet  Commonly known as: TOPROL-XL  take 1/2 tablet by mouth in the morning and 1 tablet in the evening     ranolazine 500 MG Tb12  Commonly known as: RANEXA  TAKE ONE TABLET BY MOUTH TWICE DAILY     rosuvastatin 20 MG tablet  Commonly known as: CRESTOR  TAKE ONE TABLET BY MOUTH EVERY EVENING     RYBELSUS 14 mg tablet  Generic drug: semaglutide  Take 14 mg by mouth.     sildenafiL 100 MG tablet  Commonly known as: VIAGRA  Take 1 tablet (100 mg total) by mouth daily as needed for Erectile Dysfunction.     syringe with needle 3 mL 18 x 1 1/2" Syrg  Commonly known as: BD LUER-MAYITO SYRINGE  1 each by Misc.(Non-Drug; Combo Route) route once a week.     testosterone cypionate 200 mg/mL injection  Commonly known as: DEPOTESTOTERONE CYPIONATE  Inject 1 mL (200 mg total) into the muscle every 14 (fourteen) days.     zolpidem 10 mg Tab  Commonly known as: AMBIEN  Take 1 tablet (10 mg total) by mouth every evening.            STOP taking these " medications      meloxicam 15 MG tablet  Commonly known as: MOBIC          DC home  FU 2 weeks    Kerrie Kent, ANP  General Surgery  [facility] - Med [facility]

## 2023-05-16 NOTE — CONSULTS
"[facility] - Med Surg  Adult Nutrition  Consult Note    SUMMARY     Recommendations    Recommendation/Intervention: Pt to continue nutritional monitoring and education in bariatric clinic.  Goals: Understanding of dietary protocols and tolerance to diet.  Nutrition Goal Status: goal met  Communication of RD Recs: discussed on rounds    Assessment and Plan    No new Assessment & Plan notes have been filed under this hospital service since the last note was generated.  Service: Nutrition       Malnutrition Assessment                                       Reason for Assessment    Reason For Assessment: consult  Diagnosis: other (see comments) (s/p VSG)  Relevant Medical History: BED, DM, HTN, hypercholesterolemia, heart disease, morbid obesity, osteopenia, LUCI  Interdisciplinary Rounds: attended  General Information Comments: Pt was able to recite with ease the nutritional protocols for home discharge.  Nutrition Discharge Planning: Pt to remain on bariatric clear liquids through home discharge.    Nutrition Risk Screen    Nutrition Risk Screen: no indicators present    Nutrition/Diet History    Spiritual, Cultural Beliefs, Scientology Practices, Values that Affect Care: no  Factors Affecting Nutritional Intake: early satiety, clear liquid diet, decreased appetite (as expected)    Anthropometrics    Temp: 98.3 °F (36.8 °C)  Height Method: Stated  Height: 5' 5" (165.1 cm)  Height (inches): 65 in  Weight Method: Standard Scale  Weight: 114.8 kg (253 lb)  Weight (lb): 253 lb  Ideal Body Weight (IBW), Male: 136 lb  % Ideal Body Weight, Male (lb): 186.03 %  BMI (Calculated): 42.1  BMI Grade: greater than 40 - morbid obesity  Usual Body Weight (UBW), k.9 kg  % Usual Body Weight: 99.22  % Weight Change From Usual Weight: -0.98 %       Lab/Procedures/Meds         Physical Findings/Assessment         Estimated/Assessed Needs    Weight Used For Calorie Calculations: 71 kg (156 lb 8.4 oz)  Energy Calorie " Requirements (kcal): 4004-4195 kcals/d  Energy Need Method: Kcal/kg  Protein Requirements: 71-78g/d  Weight Used For Protein Calculations: 71 kg (156 lb 8.4 oz)  Fluid Requirements (mL): 2296 ml/d  Estimated Fluid Requirement Method: other (see comments) (20 ml/kg actual BW/d)  RDA Method (mL): 1420         Nutrition Prescription Ordered    Current Diet Order: Bariatric clear liquids  Nutrition Order Comments: Pt to remain on bariatric clear liquids through home discharge.    Evaluation of Received Nutrient/Fluid Intake    Energy Calories Required: not meeting needs (as expected)  Protein Required: not meeting needs (as expected)  Fluid Required: meeting needs  Tolerance: tolerating  % Intake of Estimated Energy Needs: 0 - 25 %  % Meal Intake: 0 - 25 %    Nutrition Risk    Level of Risk/Frequency of Follow-up: low       Monitor and Evaluation    Food and Nutrient Intake: energy intake, food and beverage intake  Food and Nutrient Adminstration: diet order  Knowledge/Beliefs/Attitudes: food and nutrition knowledge/skill  Anthropometric Measurements: weight, weight change, body mass index       Nutrition Follow-Up    RD Follow-up?: Yes (2w bariatric clinic follow up)

## 2023-05-16 NOTE — PROGRESS NOTES
Date of education: 5/16/2023  Pt education type: []Pre op  [x]Post op  Surgery date: 5/15/2023  Type of surgery: sleeve gastrectomy     Education was provided on:   [x]Importance of protein and vitamin protocol  [x]Importance of drinking  fl oz/day of non carbonated sugar free non caffeinated beverages  [x]Importance of following dietary protocol  [x]Importance of ambulation postop   [x]Use of incentive spirometer 10 times an hour while awake  [x]Non opiod pain management post op   [x]Discontinuing use of meds containing aspirin, ibuprofen, NSAIDs, post op  [x]Signs and symptoms of immediate and long term complications post-op  [x]Prevention and signs and symptoms of blood clots   [x]Prevention and signs of infection  [x]Reviewed medication regimen  [x]Importance of adhering to behavioral changes  [x]Importance of following exercise protocol      Barriers to learning:  [x]None evident  []Acuity of illness  []Cognitive defects  []Cultural barriers  []Desire/Motivation  []Difficulty concentrating  []Emotional state  []Financial concerns  []Hearing deficit  []Language barrier  []Literacy  []Memory problems  []Vision impairment     Teaching methods:  []Demonstration  [x]Explanation  []Printed materials  [x]Teach back  []Virtual/web based    Expected Compliance:  [x]Good  []Fair  []Poor    Additional Notes:  Reviewed above protocols with patient. He was able to recite the above protocols without difficulty. Reiterated the importance of following the post-op dietary and behavioral guidelines to prevent complications.

## 2023-05-17 VITALS
OXYGEN SATURATION: 95 % | SYSTOLIC BLOOD PRESSURE: 115 MMHG | HEART RATE: 73 BPM | WEIGHT: 253 LBS | BODY MASS INDEX: 42.15 KG/M2 | DIASTOLIC BLOOD PRESSURE: 70 MMHG | TEMPERATURE: 98 F | HEIGHT: 65 IN | RESPIRATION RATE: 20 BRPM

## 2023-05-19 ENCOUNTER — PATIENT MESSAGE (OUTPATIENT)
Dept: ADMINISTRATIVE | Facility: OTHER | Age: 58
End: 2023-05-19
Payer: COMMERCIAL

## 2023-05-21 ENCOUNTER — PATIENT MESSAGE (OUTPATIENT)
Dept: ADMINISTRATIVE | Facility: OTHER | Age: 58
End: 2023-05-21
Payer: COMMERCIAL

## 2023-05-22 ENCOUNTER — TELEPHONE (OUTPATIENT)
Dept: BARIATRICS | Facility: HOSPITAL | Age: 58
End: 2023-05-22
Payer: COMMERCIAL

## 2023-05-22 NOTE — TELEPHONE ENCOUNTER
Date call was completed: 5/22/2023  Date and type of Surgery: 5/15/2023    Are you drinking the recommended amount of water (73-100oz) a day? [x]  Yes        []  No  If not, how may ounces of water are you drinking?     Are you drinking the recommended amount of protein a day?(recommendations base on individual goal)        [x]  Yes        []  No  If not, how many grams of protein are you drinking? 60-70gms    Are you taking the recommended supplements that were discussed in pre-op class?  [x]  Yes   [] No  Multivitamin [x]  Yes        []  No  twice a day  Calcium Supplement [x]  Yes        []  No three times a day  Iron [x]  Yes        []  No once a day  Miralax [x]  Yes        []  No    Are you walking at least 20 minutes a day for exercise? [x]  Yes   [] No    Have you resumed your home medications that you were instructed to resume? [x]  Yes   [] No    Do you have a follow-up appt scheduled with your family doctor or doctor treating you for you co-morb conditions within the week? [x]  Yes   [] No    Are you taking the Protonix (at night) that was prescribed to you in the hospital? [x]  Yes   [] No    Are you showering daily with an antibacterial soap?  [x]  Yes   [] No    Have you had a bowel movement since surgery? [x]  Yes   [] No

## 2023-05-26 ENCOUNTER — CLINICAL SUPPORT (OUTPATIENT)
Dept: BARIATRICS | Facility: HOSPITAL | Age: 58
End: 2023-05-26

## 2023-05-26 VITALS — BODY MASS INDEX: 39.32 KG/M2 | HEIGHT: 65 IN | WEIGHT: 236 LBS

## 2023-05-26 NOTE — PROGRESS NOTES
POST OP BARIATRIC NUTRITION FOLLOW UP    Type of surgery: sleeve gastrectomy   Post-op visit:   [x] 2 weeks  [] 4 weeks:  [] 2 months:  [] 4 months:  [] 6 months:  [] 9 months:  [] 1 year:   [] Other:     Weight: 236#       Post op complications:   [] Yes [x] No  If yes: [] Nausea   [] Vomiting   [] Constipation    [] Diarrhea    [] Other:     Dietary tolerance:  [x] Yes [] No [] Comment:     Adequate fluid intake:  [x] Yes [] No Approx. daily fluid intake: 85 fl oz/d  Adequate protein intake:  [x] Yes [] No  Approx. daily protein intake: 70g/d    Vitamins/Minerals:   [x] MVI:    [] Biotin:  [x] Calcium:    [] Hair/Nails:  [] B 50 complex:   [] Bariatric Specific MVI:  [] B12:    [] Bariatric Specific Calcium:  [x] Iron:    [] Other:   [] Non-compliance:        Labs:   not ordered for this visit.  Comment:    Expected compliance:  [x]Good  []Fair  []Poor      Progress:   [x]Patient progressing well at this time with no complaints   [] Patient expressed complaint at this time: See additional notes       Bariatric Diet Education:   Verbalizes understanding Demonstrates  Needs further teaching Needs practice/ supervision Comments    Bariatric Clear [] [] [] []    Bariatric Full [] [] [] []    Bariatric Puree [x] [] [] []    Bariatric Soft [x] [] [] []    Bariatric Regular [] [] [] []    Bariatric Reintroduction of Starchy CHO [] [] [] []    Bariatric: Mindful Eating [] [] [] []    Importance of Protein and Vitamin Protocol [] [] [] []    Other:            Additional Notes:

## 2023-07-11 PROCEDURE — 83550 IRON BINDING TEST: CPT | Performed by: FAMILY MEDICINE

## 2023-07-11 PROCEDURE — 84425 ASSAY OF VITAMIN B-1: CPT | Performed by: FAMILY MEDICINE

## 2023-07-11 PROCEDURE — 82607 VITAMIN B-12: CPT | Performed by: FAMILY MEDICINE

## 2023-07-19 ENCOUNTER — CLINICAL SUPPORT (OUTPATIENT)
Dept: BARIATRICS | Facility: HOSPITAL | Age: 58
End: 2023-07-19

## 2023-07-19 VITALS — BODY MASS INDEX: 37.49 KG/M2 | BODY MASS INDEX: 37.46 KG/M2 | WEIGHT: 225 LBS | WEIGHT: 225.13 LBS | HEIGHT: 65 IN

## 2023-07-19 NOTE — PROGRESS NOTES
.A 2 month F/U post gastric sleeve surgery was conducted with patient. He weighs in at 225.1 lbs. He is exercising at the gym 3-5x/week by doing cardio. No issues or concerns at this time.    A body composition and measurements were conducted.  Patient was educated on results. She lost 44 lbs. And 17 inches.    PLAN:  - Patient will walk 30 minutes or more 3-5x/week and start strengthening exercises 2x/week.  - Patient will follow dietary advice from Ely Turcios RD.    It is my professional opinion that patient is in the action stage of behavior change.    MILLY Flaherty, CPT, CHC

## 2023-07-19 NOTE — PROGRESS NOTES
POST OP BARIATRIC NUTRITION FOLLOW UP    Type of surgery: sleeve gastrectomy   Post-op visit:   [] 2 weeks  [] 4 weeks:  [x] 2 months:  [] 4 months:  [] 6 months:  [] 9 months:  [] 1 year:   [] Other:     Weight: 225#       Post op complications:   [] Yes [x] No  If yes: [] Nausea   [] Vomiting   [] Constipation    [] Diarrhea    [] Other:     Dietary tolerance:  [x] Yes [] No [] Comment:     Adequate fluid intake:  [x] Yes [] No Approx. daily fluid intake:   Adequate protein intake:  [x] Yes [] No  Approx. daily protein intake:    Vitamins/Minerals:   [x] MVI:    [] Biotin:  [x] Calcium:    [] Hair/Nails:  [] B 50 complex:   [] Bariatric Specific MVI:  [] B12:    [] Bariatric Specific Calcium:  [x] Iron:    [] Other:   [] Non-compliance:        Labs:   all WNLs  Comment:    Expected compliance:  [x]Good  []Fair  []Poor      Progress:   [x]Patient progressing well at this time with no complaints   [] Patient expressed complaint at this time: See additional notes       Bariatric Diet Education:   Verbalizes understanding Demonstrates  Needs further teaching Needs practice/ supervision Comments    Bariatric Clear [] [] [] []    Bariatric Full [] [] [] []    Bariatric Puree [] [] [] []    Bariatric Soft [] [] [] []    Bariatric Regular [x] [] [] []    Bariatric Reintroduction of Starchy CHO [] [] [] []    Bariatric: Mindful Eating [] [] [] []    Importance of Protein and Vitamin Protocol [x] [] [] []    Other:            Additional Notes:

## 2023-08-28 PROBLEM — Z79.899 ON STATIN THERAPY: Status: ACTIVE | Noted: 2023-08-28

## 2023-09-18 ENCOUNTER — CLINICAL SUPPORT (OUTPATIENT)
Dept: BARIATRICS | Facility: HOSPITAL | Age: 58
End: 2023-09-18

## 2023-09-18 VITALS — WEIGHT: 210 LBS | BODY MASS INDEX: 34.95 KG/M2

## 2023-09-18 DIAGNOSIS — E66.9 OBESITY: Primary | ICD-10-CM

## 2023-09-18 DIAGNOSIS — Z98.84 HX OF BARIATRIC SURGERY: Primary | ICD-10-CM

## 2023-09-18 NOTE — PROGRESS NOTES
POST OP BARIATRIC NUTRITION FOLLOW UP    Type of surgery: sleeve gastrectomy   Post-op visit:   [] 2 weeks  [] 4 weeks:  [] 2 months:  [x] 4 months:  [] 6 months:  [] 9 months:  [] 1 year:   [] Other:     Weight: 210       Post op complications:   [] Yes [x] No  If yes: [] Nausea   [] Vomiting   [] Constipation    [] Diarrhea    [] Other:     Dietary tolerance:  [x] Yes [] No [] Comment:     Adequate fluid intake:  [x] Yes [] No Approx. daily fluid intake:   Adequate protein intake:  [x] Yes [] No  Approx. daily protein intake:    Vitamins/Minerals:   [x] MVI:    [] Biotin:  [x] Calcium:    [] Hair/Nails:  [x] B 50 complex:   [] Bariatric Specific MVI:  [x] B12:    [] Bariatric Specific Calcium:  [] Iron:    [] Other:   [] Non-compliance:        Labs:   NA  Comment:    Expected compliance:  [x]Good  []Fair  []Poor      Progress:   [x]Patient progressing well at this time with no complaints   [] Patient expressed complaint at this time: See additional notes       Bariatric Diet Education:   Verbalizes understanding Demonstrates  Needs further teaching Needs practice/ supervision Comments    Bariatric Clear [] [] [] []    Bariatric Full [] [] [] []    Bariatric Puree [] [] [] []    Bariatric Soft [] [] [] []    Bariatric Regular [x] [] [] []    Bariatric Reintroduction of Starchy CHO [] [] [] []    Bariatric: Mindful Eating [] [] [] []    Importance of Protein and Vitamin Protocol [x] [] [] []    Other:            Additional Notes:     Pt is doing well but states she has had some starch through no carb keto tortilla, recd he limit those so he can focus on protein intake

## 2023-09-18 NOTE — PROGRESS NOTES
A 4 month F/U was conducted with Da via phone. He reports a verbal weight of 210 lbs. He is exercising by doing cardio and weights 3-5x/week. Patient has no issues or concerns at this time.     It is my professional opinion that patient is in the action stage of behavior change.    MILLY Flaherty, CPT, CHC

## 2023-11-20 ENCOUNTER — CLINICAL SUPPORT (OUTPATIENT)
Dept: BARIATRICS | Facility: HOSPITAL | Age: 58
End: 2023-11-20

## 2023-11-20 VITALS — WEIGHT: 205 LBS | BODY MASS INDEX: 34.11 KG/M2

## 2023-11-20 DIAGNOSIS — Z98.84 HX OF BARIATRIC SURGERY: Primary | ICD-10-CM

## 2023-11-20 DIAGNOSIS — E66.9 OBESITY: Primary | ICD-10-CM

## 2023-11-20 NOTE — PROGRESS NOTES
A 6 month F/U was conducted with Lang via phone. He reports a verbal weight of 205 lbs. His goal is approximately 190 lbs. He did get off track with exercising due to an injury, however, he plans to get back on track. No issues or concerns at this time.      Isabella Miller, MILLY, CPT, CHC

## 2023-11-20 NOTE — PROGRESS NOTES
"POST OP BARIATRIC NUTRITION FOLLOW UP    Type of surgery: sleeve gastrectomy  Post-op visit:   [] 2 weeks  [] 4 weeks:  [] 2 months:  [] 4 months:  [x] 6 months:  [] 9 months:  [] 1 year:   [] Other:     Height:   Ht Readings from Last 1 Encounters:   08/28/23 5' 5" (1.651 m)      Weight:   Wt Readings from Last 3 Encounters:   11/20/23 1540 93 kg (205 lb)   09/18/23 1542 95.3 kg (210 lb)   08/28/23 0933 98.7 kg (217 lb 9.6 oz)      BMI:   BMI Readings from Last 1 Encounters:   11/20/23 34.11 kg/m²            Post op complications:   [x] Yes [] No  If yes: [] Nausea   [] Vomiting   [] Constipation    [] Diarrhea    [] Other:     Dietary tolerance:  [x] Yes [] No [] Comment:     Adequate fluid intake:  [x] Yes [] No Approx. daily fluid intake:   Adequate protein intake:  [x] Yes [] No  Approx. daily protein intake:    Vitamins/Minerals:   [x] MVI:    [] Biotin:  [x] Calcium:    [] Hair/Nails:  [x] B 50 complex:   [] Bariatric Specific MVI:  [x] B12:    [] Bariatric Specific Calcium:  [] Iron:    [] Other:   [] Non-compliance:        Labs:    no completed yet  Comment:    Expected compliance:  [x]Good  []Fair  []Poor      Progress:   [x]Patient progressing well at this time with no complaints   [] Patient expressed complaint at this time: See additional notes       Bariatric Diet Education:   Verbalizes understanding Demonstrates  Needs further teaching Needs practice/ supervision Comments    Bariatric Clear [] [] [] []    Bariatric Full [] [] [] []    Bariatric Puree [] [] [] []    Bariatric Soft [] [] [] []    Bariatric Regular [x] [] [] []    Bariatric Reintroduction of Starchy CHO [x] [] [] []    Bariatric: Mindful Eating [] [] [] []    Importance of Protein and Vitamin Protocol [] [] [] []    Other:            Additional Notes:    Pt is doing well with no complaints - feels good and understands diet progression     "

## 2023-12-12 ENCOUNTER — TELEPHONE (OUTPATIENT)
Dept: SURGERY | Facility: CLINIC | Age: 58
End: 2023-12-12
Payer: COMMERCIAL

## 2023-12-27 PROCEDURE — 83540 ASSAY OF IRON: CPT | Performed by: FAMILY MEDICINE

## 2023-12-27 PROCEDURE — 82607 VITAMIN B-12: CPT | Performed by: FAMILY MEDICINE

## 2023-12-27 PROCEDURE — 84425 ASSAY OF VITAMIN B-1: CPT | Performed by: FAMILY MEDICINE

## 2024-01-03 NOTE — PROGRESS NOTES
"Initial Consult  Da Edwards  Chief Complaint   Patient presents with    Post-op Evaluation     VSG 05/15/23 - 6 month FU     History of Present Illness:  Mr. Da Edwards is a 58 y.o. male who is 6 mth s/p lap gastric sleeve surgery on 5/15/23 by Dm Tomas MD. Presents today for 6 mth follow up appt. No complaints with tolerating PO. No dysphagia, odynophagia, GERD, NV, or abd pain. Regular BMs.     Still complaining of a "sticking" sensation in epigastric area at times,imtiaz with movement. + joint pain, bilateral knees and R shoulder. Taking tumeric and also joint supplement, began 1 week ago. No other issues or problems today      Diet: compliant with bariatric meal plan  Exercise: regular exercise, walking and plans on weight lifting as well  Vitamins: compliant with bariatric supplementation         Labs (12/27/23): glucose: 136, bun: 23,         Ht: 65in  Weights:   Trend Weights BMI   Starting 266# 44   Pre-Op 255#    2 Week 234# 38   2 Month 225 37   6 Month 209# 34    1 Year     2 Year               For Adults 20 Years and Older:    BMI Weight Status   Below 18.5 Underweight   18.6-24.9 Normal/Healthy   25.0-29.9 Overweight   30.0 & Above Obese     Ideal Weight Range for Your Height: 5'5" = 114 - 149 lbs      Pertinent History:  HTN - [x] Yes   [] No  HLD - [x] Yes   [] No  DM  -  [x] Yes   [] No, A1c: 5.6%  LUCI - [x] Yes   [] No  GERD - [] Yes   [x] No  Anticoagulation- [] Yes   [x] No    Patient Care Team:  Anil Arambula MD as PCP - General (Family Medicine)  Raul Becerra MD as Consulting Physician (Cardiology)  Dm Tomas MD as Surgeon (Bariatrics)     Subjective:     See HPI for details    Constitutional: Denies Change in appetite. Denies Chills. Denies Fever. Denies Night sweats.  Respiratory: Denies Cough. Denies Shortness of breath. Denies Shortness of breath with exertion. Denies Wheezing.  Cardiovascular: Denies Chest pain at rest. Denies Chest pain with exertion. " Denies Irregular heartbeat. Denies Palpitations. Denies Edema.  Gastrointestinal: Denies Abdominal pain. DeniesDiarrhea. Denies Nausea. Denies Vomiting. Denies Hematemesis or Hematochezia.  Genitourinary: Denies Dysuria. Denies Urinary frequency. Denies Urinary urgency. Denies Blood in urine.  Endocrine: Denies Cold intolerance. Denies Excessive thirst. Denies Heat intolerance. Endorses Weight loss.   Musculoskeletal: + Painful joints. Denies Weakness.  Integumentary: Denies Rash. Denies Itching. Denies Dry skin.  Neurologic: Denies Dizziness. Denies Fainting. Denies Headache.  Psychiatric: Denies Depression. Denies Anxiety. Denies Suicidal/Homicidal ideations.    12 point review of systems conducted, negative except as stated in the history of present illness. See HPI for details.    Review of patient's allergies indicates:  No Known Allergies  Past Medical History:   Diagnosis Date    Asymptomatic varicose veins of unspecified lower extremity     BDR (background diabetic retinopathy)     Binge eating disorder     Chronic venous stasis dermatitis of both lower extremities     Family history of prostate cancer     Gout, unspecified     History of COVID-19     HLD (hyperlipidemia)     Insomnia     Low testosterone     Male erectile dysfunction, unspecified     Microalbuminuria     Obesity, unspecified     LUCI (obstructive sleep apnea)     Osteopenia     Right inguinal hernia     Swelling     Type 2 diabetes mellitus without complications      Past Surgical History:   Procedure Laterality Date    CIRCUMCISION      COLONOSCOPY      ESOPHAGOGASTRODUODENOSCOPY N/A 03/17/2023    Procedure: EGD (ESOPHAGOGASTRODUODENOSCOPY);  Surgeon: Dm Tomas MD;  Location: [facility] OR;  Service: General;  Laterality: N/A;    LAPAROSCOPIC SLEEVE GASTRECTOMY N/A 5/15/2023    Procedure: GASTRECTOMY, SLEEVE, LAPAROSCOPIC  **SELF PAY**;  Surgeon: Dm Tomas MD;  Location: [facility] OR;  Service: General;  Laterality: N/A;    [facility]       Family History   Problem Relation Age of Onset    Diabetes Maternal Uncle      Social History     Tobacco Use    Smoking status: Former     Current packs/day: 0.00     Types: Cigarettes     Start date: 1/1/1994     Quit date: 2009     Years since quitting: 15.0    Smokeless tobacco: Never   Substance Use Topics    Alcohol use: Not Currently     Alcohol/week: 3.0 standard drinks of alcohol     Types: 1 Glasses of wine, 1 Cans of beer, 1 Shots of liquor per week     Comment: 3-4 times a year    Drug use: Not Currently     Types: Marijuana     Comment: every other month      Current Outpatient Medications   Medication Instructions    allopurinoL (ZYLOPRIM) 100 MG tablet TAKE ONE TABLET BY MOUTH TWICE DAILY    metoprolol succinate (TOPROL-XL) 50 MG 24 hr tablet take 1/2 tablet by mouth in the morning and 1 tablet in the evening    pantoprazole (PROTONIX) 40 mg, Oral, Daily, Take once daily for first 30 days post op    ranolazine (RANEXA) 500 MG Tb12 TAKE ONE TABLET BY MOUTH TWICE DAILY    rosuvastatin (CRESTOR) 20 MG tablet TAKE ONE TABLET BY MOUTH EVERY EVENING    sildenafiL (VIAGRA) 100 mg, Oral, Daily PRN    zolpidem (AMBIEN) 10 mg, Oral, Nightly       Objective:     Vital Signs (Most Recent):  Visit Vitals  /76 (BP Location: Left arm, Patient Position: Sitting)   Pulse (!) 45   Ht 5' 5" (1.651 m)   Wt 94.9 kg (209 lb 4.8 oz)   BMI 34.83 kg/m²       Physical Exam:  General:  Alert and oriented.    Respiratory:  Lungs are clear to auscultation, Respirations are non-labored, Breath sounds are equal.    Cardiovascular:  Normal rate, Regular rhythm, No murmur.    Gastrointestinal:  Soft, Non-tender, Non-distended, Normal bowel sounds    Musculoskeletal:  Normal range of motion, Normal strength.    Integumentary:  Warm, Dry, Pink.    Neurologic:  Alert, Oriented.    Psychiatric:  Cooperative.      Assessment:       ICD-10-CM ICD-9-CM   1. Postoperative visit  Z48.89 V58.49   2. S/P bariatric " surgery  Z98.84 V45.86   3. Impaired fasting glucose  R73.01 790.21       Plan:     1. Postoperative visit        2. S/P bariatric surgery        3. Impaired fasting glucose                    - begin weight lifting exercises but take is slow. Body weight exercises first, good warm ups, to avoid injury and increased joint pain.       - continue to f/u with PCP regarding blood sugar. Not currently on medication for this. Continue to lose weight with diet and exercise changes.       - scar tissue to epigastric area near incision. Will go away with time  - F/U 6 months with bariatric labs  - Continue diet  - Continue vitamin supplementation  - Support group attendance encouraged  - Keep routine appts with PCP/specialists as scheduled

## 2024-01-04 ENCOUNTER — OFFICE VISIT (OUTPATIENT)
Dept: SURGERY | Facility: CLINIC | Age: 59
End: 2024-01-04
Payer: COMMERCIAL

## 2024-01-04 VITALS
SYSTOLIC BLOOD PRESSURE: 122 MMHG | HEIGHT: 65 IN | WEIGHT: 209.31 LBS | HEART RATE: 45 BPM | DIASTOLIC BLOOD PRESSURE: 76 MMHG | BODY MASS INDEX: 34.87 KG/M2

## 2024-01-04 DIAGNOSIS — R73.01 IMPAIRED FASTING GLUCOSE: ICD-10-CM

## 2024-01-04 DIAGNOSIS — Z48.89 POSTOPERATIVE VISIT: Primary | ICD-10-CM

## 2024-01-04 DIAGNOSIS — Z98.84 S/P BARIATRIC SURGERY: ICD-10-CM

## 2024-01-04 PROCEDURE — 3078F DIAST BP <80 MM HG: CPT | Mod: CPTII,,, | Performed by: NURSE PRACTITIONER

## 2024-01-04 PROCEDURE — 3008F BODY MASS INDEX DOCD: CPT | Mod: CPTII,,, | Performed by: NURSE PRACTITIONER

## 2024-01-04 PROCEDURE — 3074F SYST BP LT 130 MM HG: CPT | Mod: CPTII,,, | Performed by: NURSE PRACTITIONER

## 2024-01-04 PROCEDURE — 99024 POSTOP FOLLOW-UP VISIT: CPT | Mod: ,,, | Performed by: NURSE PRACTITIONER

## 2024-01-04 PROCEDURE — 1159F MED LIST DOCD IN RCRD: CPT | Mod: CPTII,,, | Performed by: NURSE PRACTITIONER

## 2024-02-21 ENCOUNTER — CLINICAL SUPPORT (OUTPATIENT)
Dept: BARIATRICS | Facility: HOSPITAL | Age: 59
End: 2024-02-21

## 2024-02-21 DIAGNOSIS — Z98.84 HX OF BARIATRIC SURGERY: Primary | ICD-10-CM

## 2024-02-21 NOTE — PROGRESS NOTES
Pt attended 9m group follow up class. Pt reports no complaints at this time. Pt advised to call with questions/concerns if needed prior to 1 year individual follow up.     MILLY Flaherty, CPT, CHC

## 2024-02-21 NOTE — PROGRESS NOTES
Pt attended 9m group follow up class. Pt reports no complaints at this time. Pt advised to call with questions/concerns if needed prior to 1 year individual follow up.    States he is struggling with some sweets craving and to honor his cravings but count it as the starch serving/ watch portions and make sure to get enough protein and water

## 2024-04-30 PROBLEM — E78.00 PURE HYPERCHOLESTEROLEMIA: Status: ACTIVE | Noted: 2022-05-04

## 2024-05-13 ENCOUNTER — CLINICAL SUPPORT (OUTPATIENT)
Dept: BARIATRICS | Facility: HOSPITAL | Age: 59
End: 2024-05-13

## 2024-05-13 DIAGNOSIS — Z98.84 HX OF BARIATRIC SURGERY: Primary | ICD-10-CM

## 2024-05-13 DIAGNOSIS — E66.9 OBESITY: Primary | ICD-10-CM

## 2024-05-13 NOTE — PROGRESS NOTES
"POST OP BARIATRIC NUTRITION FOLLOW UP    Type of surgery: sleeve gastrectomy  Post-op visit:   [] 2 weeks  [] 4 weeks:  [] 2 months:  [] 4 months:  [] 6 months:  [] 9 months:  [x] 1 year:   [] Other:     Height:   Ht Readings from Last 1 Encounters:   04/30/24 5' 5" (1.651 m)      Weight:   Wt Readings from Last 3 Encounters:   04/30/24 0904 (P) 98 kg (216 lb)   01/04/24 1014 94.9 kg (209 lb 4.8 oz)   12/27/23 0911 94.3 kg (208 lb)      BMI:   BMI Readings from Last 1 Encounters:   04/30/24 (P) 35.94 kg/m²            Post op complications:   [] Yes [x] No  If yes: [] Nausea   [] Vomiting   [] Constipation    [] Diarrhea    [] Other:     Dietary tolerance:  [x] Yes [] No [] Comment:     Adequate fluid intake:  [x] Yes [] No Approx. daily fluid intake: 80 oz  Adequate protein intake:  [x] Yes [] No  Approx. daily protein intake: 70 g    Vitamins/Minerals:   [] MVI:    [] Biotin:  [x] Calcium:    [] Hair/Nails:  [] B 50 complex:   [x] Bariatric Specific MVI:  [] B12:    [] Bariatric Specific Calcium:  [] Iron:    [] Other:   [] Non-compliance:        Labs:     Comment:    Expected compliance:  [x]Good  []Fair  []Poor      Progress:   [x]Patient progressing well at this time with no complaints   [] Patient expressed complaint at this time: See additional notes       Bariatric Diet Education:   Verbalizes understanding Demonstrates  Needs further teaching Needs practice/ supervision Comments    Bariatric Clear [] [] [] []    Bariatric Full [] [] [] []    Bariatric Puree [] [] [] []    Bariatric Soft [] [] [] []    Bariatric Regular [] [] [] []    Bariatric Reintroduction of Starchy CHO [] [] [] []    Bariatric: Mindful Eating [x] [] [] []    Importance of Protein and Vitamin Protocol [x] [] [] []    Other:            Additional Notes:   Pt has lost about 60# overall and he is doing well with no n/v/d and tolerates all foods. He is hitting his protein, fluid, and vitamin goals daily and still stays away from starches. Pt " still wants to lose about 15# to reach goal weight, so suggested increasing intensity/duration of exercises. Told pt to call if he ever had any questions/concerns in the future.

## 2024-05-13 NOTE — PROGRESS NOTES
A 1 year visit was conducted with Da in the office.  A body composition was conducted and patient lost 56 lbs.(62 lbs. From highest weight) and 25.5 inches since preop body composition was conducted.  Patient was educated on is results. He is walking for exercise and doing weight machines. He doesn't have a regular consistent regimen due to work. 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 His goal is to weigh 205 lbs.    It is my professional opinion that patient is in the action phase of behavior change.      MILLY Flaherty, CPT, CHC

## 2024-05-17 DIAGNOSIS — Z13.21 ENCOUNTER FOR VITAMIN DEFICIENCY SCREENING: Primary | ICD-10-CM

## 2024-05-17 DIAGNOSIS — Z13.0 SCREENING, ANEMIA, DEFICIENCY, IRON: ICD-10-CM

## 2024-05-20 ENCOUNTER — LAB VISIT (OUTPATIENT)
Dept: LAB | Facility: HOSPITAL | Age: 59
End: 2024-05-20
Attending: NURSE PRACTITIONER
Payer: COMMERCIAL

## 2024-05-20 ENCOUNTER — OFFICE VISIT (OUTPATIENT)
Dept: SURGERY | Facility: CLINIC | Age: 59
End: 2024-05-20
Payer: COMMERCIAL

## 2024-05-20 VITALS
TEMPERATURE: 98 F | DIASTOLIC BLOOD PRESSURE: 77 MMHG | HEART RATE: 56 BPM | HEIGHT: 65 IN | SYSTOLIC BLOOD PRESSURE: 120 MMHG | WEIGHT: 213 LBS | BODY MASS INDEX: 35.49 KG/M2

## 2024-05-20 DIAGNOSIS — Z71.3 ENCOUNTER FOR WEIGHT LOSS COUNSELING: ICD-10-CM

## 2024-05-20 DIAGNOSIS — E66.9 OBESITY, UNSPECIFIED CLASSIFICATION, UNSPECIFIED OBESITY TYPE, UNSPECIFIED WHETHER SERIOUS COMORBIDITY PRESENT: ICD-10-CM

## 2024-05-20 DIAGNOSIS — Z90.3 S/P GASTRIC SLEEVE PROCEDURE: ICD-10-CM

## 2024-05-20 DIAGNOSIS — Z71.82 EXERCISE COUNSELING: ICD-10-CM

## 2024-05-20 DIAGNOSIS — Z71.3 DIETARY COUNSELING: ICD-10-CM

## 2024-05-20 DIAGNOSIS — Z13.21 ENCOUNTER FOR VITAMIN DEFICIENCY SCREENING: ICD-10-CM

## 2024-05-20 DIAGNOSIS — Z91.89 ELECTROLYTE IMBALANCE RISK: ICD-10-CM

## 2024-05-20 DIAGNOSIS — R63.5 WEIGHT GAIN: ICD-10-CM

## 2024-05-20 DIAGNOSIS — Z13.0 SCREENING, ANEMIA, DEFICIENCY, IRON: ICD-10-CM

## 2024-05-20 DIAGNOSIS — Z13.21 ENCOUNTER FOR VITAMIN DEFICIENCY SCREENING: Primary | ICD-10-CM

## 2024-05-20 LAB
BASOPHILS # BLD AUTO: 0.01 X10(3)/MCL
BASOPHILS NFR BLD AUTO: 0.2 %
EOSINOPHIL # BLD AUTO: 0.11 X10(3)/MCL (ref 0–0.9)
EOSINOPHIL NFR BLD AUTO: 1.7 %
ERYTHROCYTE [DISTWIDTH] IN BLOOD BY AUTOMATED COUNT: 12.6 % (ref 11.5–17)
HCT VFR BLD AUTO: 39.2 % (ref 42–52)
HGB BLD-MCNC: 13.1 G/DL (ref 14–18)
IMM GRANULOCYTES # BLD AUTO: 0.01 X10(3)/MCL (ref 0–0.04)
IMM GRANULOCYTES NFR BLD AUTO: 0.2 %
IRON SATN MFR SERPL: 39 % (ref 20–50)
IRON SERPL-MCNC: 104 UG/DL (ref 65–175)
LYMPHOCYTES # BLD AUTO: 2.56 X10(3)/MCL (ref 0.6–4.6)
LYMPHOCYTES NFR BLD AUTO: 39 %
MCH RBC QN AUTO: 28.2 PG (ref 27–31)
MCHC RBC AUTO-ENTMCNC: 33.4 G/DL (ref 33–36)
MCV RBC AUTO: 84.3 FL (ref 80–94)
MONOCYTES # BLD AUTO: 0.49 X10(3)/MCL (ref 0.1–1.3)
MONOCYTES NFR BLD AUTO: 7.5 %
NEUTROPHILS # BLD AUTO: 3.39 X10(3)/MCL (ref 2.1–9.2)
NEUTROPHILS NFR BLD AUTO: 51.4 %
NRBC BLD AUTO-RTO: 0 %
PLATELET # BLD AUTO: 175 X10(3)/MCL (ref 130–400)
PMV BLD AUTO: 10.1 FL (ref 7.4–10.4)
RBC # BLD AUTO: 4.65 X10(6)/MCL (ref 4.7–6.1)
TIBC SERPL-MCNC: 166 UG/DL (ref 69–240)
TIBC SERPL-MCNC: 270 UG/DL (ref 250–450)
TRANSFERRIN SERPL-MCNC: 251 MG/DL (ref 174–364)
VIT B12 SERPL-MCNC: >2000 PG/ML (ref 213–816)
WBC # SPEC AUTO: 6.57 X10(3)/MCL (ref 4.5–11.5)

## 2024-05-20 PROCEDURE — 36415 COLL VENOUS BLD VENIPUNCTURE: CPT

## 2024-05-20 PROCEDURE — 1160F RVW MEDS BY RX/DR IN RCRD: CPT | Mod: CPTII,,, | Performed by: NURSE PRACTITIONER

## 2024-05-20 PROCEDURE — 3078F DIAST BP <80 MM HG: CPT | Mod: CPTII,,, | Performed by: NURSE PRACTITIONER

## 2024-05-20 PROCEDURE — 3044F HG A1C LEVEL LT 7.0%: CPT | Mod: CPTII,,, | Performed by: NURSE PRACTITIONER

## 2024-05-20 PROCEDURE — 1159F MED LIST DOCD IN RCRD: CPT | Mod: CPTII,,, | Performed by: NURSE PRACTITIONER

## 2024-05-20 PROCEDURE — 99024 POSTOP FOLLOW-UP VISIT: CPT | Mod: ,,, | Performed by: NURSE PRACTITIONER

## 2024-05-20 PROCEDURE — 3074F SYST BP LT 130 MM HG: CPT | Mod: CPTII,,, | Performed by: NURSE PRACTITIONER

## 2024-05-20 NOTE — PROGRESS NOTES
"Initial Consult  Da Edwards  Chief Complaint   Patient presents with    Follow-up     VSG 5/15/23       History of Present Illness:  Mr. Da Edwards is a 58 y.o. male who is 1 yr s/p lap gastric sleeve surgery on 5/15/23 by Dm Tomas MD. Presents today for annual follow up appt. No complaints with tolerating PO. No dysphagia, odynophagia, GERD, NV, or abd pain. Regular BMs.     Cravings at times but denies any other issues. Reports that he really struggled during the winter months, being cold, and did not exercise. Has continued to watch starches. Still eats protein and veggies first on plate.      Diet: compliant with bariatric meal plan for the most part.   Exercise: no dedicated regular exercise  Vitamins: compliant with bariatric supplementation          Labs (4/30/24): A1c: 5.7%, glucose: 143, need b1, b12, CBC and iron panel.       Labs (12/27/23): glucose: 136, bun: 23,          Ht: 65in  Weights:   Trend Weights BMI   Starting 266# 44   Pre-Op 255#     2 Week 234# 38   2 Month 225 37   6 Month 209# 34    1 Year  213#     2 Year                  For Adults 20 Years and Older:     BMI Weight Status   Below 18.5 Underweight   18.6-24.9 Normal/Healthy   25.0-29.9 Overweight   30.0 & Above Obese      Ideal Weight Range for Your Height: 5'5" = 114 - 149 lbs                  Pertinent History:  HTN - [x] Yes   [] No  HLD - [x] Yes   [] No  DM  -  [x] Yes   [] No, A1c: 5.6%  LUCI - [x] Yes   [] No  GERD - [] Yes   [x] No  Anticoagulation- [] Yes   [x] No       Patient Care Team:  Anil Arambula MD as PCP - General (Family Medicine)  Raul Becerra MD as Consulting Physician (Cardiology)  Dm Tomas MD as Surgeon (Bariatrics)     Subjective:     12 point review of systems conducted, negative except as stated in the history of present illness. See HPI for details.    Review of patient's allergies indicates:  No Known Allergies  Past Medical History:   Diagnosis Date    Asymptomatic " varicose veins of unspecified lower extremity     BDR (background diabetic retinopathy)     Binge eating disorder     Chronic venous stasis dermatitis of both lower extremities     Family history of prostate cancer     Gout, unspecified     History of COVID-19     Insomnia     Low testosterone     Male erectile dysfunction, unspecified     Microalbuminuria     Obesity, unspecified     Osteopenia     Right inguinal hernia     Swelling     Type 2 diabetes mellitus without complications      Past Surgical History:   Procedure Laterality Date    CIRCUMCISION      COLONOSCOPY      ESOPHAGOGASTRODUODENOSCOPY N/A 03/17/2023    Procedure: EGD (ESOPHAGOGASTRODUODENOSCOPY);  Surgeon: Dm Tomas MD;  [place] [facility] [place];  Service: General;  Laterality: N/A;    LAPAROSCOPIC SLEEVE GASTRECTOMY N/A 5/15/2023    Procedure: GASTRECTOMY, SLEEVE, LAPAROSCOPIC  **SELF PAY**;  Surgeon: Dm Tomas MD;  Location: [facility] OR;  Service: General;  Laterality: N/A;    WISDOM TOOTH EXTRACTION       Family History   Problem Relation Name Age of Onset    Diabetes Maternal Uncle Diana      Social History     Tobacco Use    Smoking status: Former     Current packs/day: 0.00     Types: Cigarettes     Start date: 1/1/1994     Quit date: 2009     Years since quitting: 15.3    Smokeless tobacco: Never   Substance Use Topics    Alcohol use: Not Currently     Alcohol/week: 3.0 standard drinks of alcohol     Types: 1 Glasses of wine, 1 Cans of beer, 1 Shots of liquor per week     Comment: 3-4 times a year    Drug use: Not Currently     Types: Marijuana     Comment: every other month      Current Outpatient Medications   Medication Instructions    allopurinoL (ZYLOPRIM) 100 MG tablet TAKE ONE TABLET BY MOUTH TWICE DAILY    metoprolol succinate (TOPROL-XL) 50 MG 24 hr tablet take 1/2 tablet by mouth in the morning and 1 tablet in the evening    ranolazine (RANEXA) 500 MG Tb12 TAKE ONE TABLET BY MOUTH TWICE DAILY    rosuvastatin (CRESTOR) 20 mg,  "Oral, Nightly, at bedtime.    sildenafiL (VIAGRA) 100 mg, Oral, Daily PRN    zolpidem (AMBIEN) 10 mg, Oral, Nightly       Objective:     Vital Signs (Most Recent):  Visit Vitals  /77   Pulse (!) 56   Temp 98.4 °F (36.9 °C)   Ht 5' 5" (1.651 m)   Wt 96.6 kg (213 lb)   BMI 35.45 kg/m²       Physical Exam:  General:  Alert and oriented.    Respiratory:  Lungs are clear to auscultation, Respirations are non-labored, Breath sounds are equal.    Cardiovascular:  Normal rate, Regular rhythm, No murmur.    Gastrointestinal:  Soft, Non-tender, Non-distended, Normal bowel sounds        Musculoskeletal:  Normal range of motion, Normal strength.    Integumentary:  Warm, Dry, Pink.    Neurologic:  Alert, Oriented.    Psychiatric:  Cooperative.      Assessment:       ICD-10-CM ICD-9-CM   1. Encounter for vitamin deficiency screening  Z13.21 V77.99   2. Screening, anemia, deficiency, iron  Z13.0 V78.0   3. Electrolyte imbalance risk  Z91.89 V49.89   4. Encounter for weight loss counseling  Z71.3 V65.3   5. Dietary counseling  Z71.3 V65.3   6. Exercise counseling  Z71.82 V65.41   7. Obesity, unspecified classification, unspecified obesity type, unspecified whether serious comorbidity present  E66.9 278.00   8. S/P gastric sleeve procedure  Z90.3 V45.75       Plan:     1. Encounter for vitamin deficiency screening        2. Screening, anemia, deficiency, iron        3. Electrolyte imbalance risk        4. Encounter for weight loss counseling        5. Dietary counseling        6. Exercise counseling        7. Obesity, unspecified classification, unspecified obesity type, unspecified whether serious comorbidity present        8. S/P gastric sleeve procedure          - placed orders for b1, b12, CBC, and iron panel. Will review once in chart.  - dicussed weight gain with patient and that he needs to watch carbs, try to increase exercise, and call us If he continues to gain weight.   - Discussed common downfalls of patients that " gain weight after their first year such as adding in carbs too soon, popcorn or other snack type foods rather than eating protein forward meals, increasing nut intake, or not measuring amount when eating nuts since this can be very high in calories, as well as falling back into previous bad habits. Pt verbalized understanding of some of the issues his is doing wrong and will try and correct these.   - Discussed possible surgical risks with patient that can occur at any point in time such as but not limited to vitamin and mineral deficiency, ulceration from smoking/NSAIDs/Anti-inflammatory medications, gallbladder disfunction, etc. If patient has any severe abd pain that is constant, nausea, vomiting, disruption of bowel movements, or has other concerns they are instructed to call the office or present to the emergency room. Pt verbalized understanding   - F/U 1 year with bariatric labs (annually)  - Continue exercising and following bariatric vitamin supplementation  - Support group attendance encouraged   - Keep routine appts with PCP/specialists as scheduled

## 2024-05-24 LAB — VIT B1 BLD-SCNC: 169 NMOL/L (ref 70–180)

## 2024-10-29 PROBLEM — R80.9 MICROALBUMINURIA: Status: RESOLVED | Noted: 2022-08-30 | Resolved: 2024-10-29

## 2024-10-29 PROBLEM — E11.3293 TYPE 2 DIABETES MELLITUS WITH BOTH EYES AFFECTED BY MILD NONPROLIFERATIVE RETINOPATHY WITHOUT MACULAR EDEMA, WITHOUT LONG-TERM CURRENT USE OF INSULIN: Status: ACTIVE | Noted: 2024-10-29

## 2024-10-29 PROBLEM — M1A.09X0 IDIOPATHIC CHRONIC GOUT OF MULTIPLE SITES WITHOUT TOPHUS: Status: ACTIVE | Noted: 2022-08-30

## 2024-10-29 PROBLEM — F51.01 PRIMARY INSOMNIA: Status: ACTIVE | Noted: 2022-08-30

## 2024-10-30 PROBLEM — E66.01 MORBID OBESITY WITH BMI OF 40.0-44.9, ADULT: Status: RESOLVED | Noted: 2022-08-30 | Resolved: 2024-10-30

## 2025-04-24 ENCOUNTER — TELEPHONE (OUTPATIENT)
Dept: SURGERY | Facility: CLINIC | Age: 60
End: 2025-04-24
Payer: COMMERCIAL

## 2025-04-24 DIAGNOSIS — Z13.0 SCREENING, ANEMIA, DEFICIENCY, IRON: ICD-10-CM

## 2025-04-24 DIAGNOSIS — Z13.21 ENCOUNTER FOR VITAMIN DEFICIENCY SCREENING: ICD-10-CM

## 2025-04-24 DIAGNOSIS — Z91.89 ELECTROLYTE IMBALANCE RISK: Primary | ICD-10-CM

## 2025-04-28 ENCOUNTER — TELEPHONE (OUTPATIENT)
Dept: SURGERY | Facility: CLINIC | Age: 60
End: 2025-04-28
Payer: COMMERCIAL

## 2025-04-28 NOTE — TELEPHONE ENCOUNTER
----- Message from ADAM Quintana sent at 4/28/2025 12:21 PM CDT -----  Regarding: RE: ? Mad DI candidate  Patient has appointment scheduled for 5/19/2025. I am not sure if patient is interested in a revision. Makayla- can you review his benefits and determine if he has coverage for weight loss surgery revision?  ----- Message -----  From: Diana Soria RN  Sent: 4/25/2025  10:44 AM CDT  To: Lilian Lindsay RN; Mayi Lagunas; Jeanie MCDONOUGH  Subject: FW: ? Mad DI candidate                             ----- Message -----  From: Dm Tomas MD  Sent: 4/25/2025   6:57 AM CDT  To: Thom Chaidez Staff  Subject: ? Mad DI candidate                               Perhaps he may be a candidate for [facility]

## 2025-05-12 ENCOUNTER — PATIENT MESSAGE (OUTPATIENT)
Dept: SURGERY | Facility: CLINIC | Age: 60
End: 2025-05-12
Payer: COMMERCIAL

## 2025-05-13 PROCEDURE — 82607 VITAMIN B-12: CPT | Performed by: SURGERY

## 2025-05-13 PROCEDURE — 83550 IRON BINDING TEST: CPT | Performed by: SURGERY

## 2025-05-19 ENCOUNTER — OFFICE VISIT (OUTPATIENT)
Dept: SURGERY | Facility: CLINIC | Age: 60
End: 2025-05-19
Payer: COMMERCIAL

## 2025-05-19 ENCOUNTER — PATIENT MESSAGE (OUTPATIENT)
Dept: SURGERY | Facility: CLINIC | Age: 60
End: 2025-05-19

## 2025-05-19 VITALS
WEIGHT: 214 LBS | BODY MASS INDEX: 35.65 KG/M2 | SYSTOLIC BLOOD PRESSURE: 147 MMHG | HEART RATE: 60 BPM | HEIGHT: 65 IN | DIASTOLIC BLOOD PRESSURE: 77 MMHG

## 2025-05-19 DIAGNOSIS — E66.9 OBESITY, UNSPECIFIED CLASS, UNSPECIFIED OBESITY TYPE, UNSPECIFIED WHETHER SERIOUS COMORBIDITY PRESENT: ICD-10-CM

## 2025-05-19 DIAGNOSIS — Z71.82 EXERCISE COUNSELING: ICD-10-CM

## 2025-05-19 DIAGNOSIS — Z71.3 DIETARY COUNSELING: ICD-10-CM

## 2025-05-19 DIAGNOSIS — Z71.3 ENCOUNTER FOR WEIGHT LOSS COUNSELING: Primary | ICD-10-CM

## 2025-05-19 PROCEDURE — 3008F BODY MASS INDEX DOCD: CPT | Mod: CPTII,,, | Performed by: NURSE PRACTITIONER

## 2025-05-19 PROCEDURE — 3060F POS MICROALBUMINURIA REV: CPT | Mod: CPTII,,, | Performed by: NURSE PRACTITIONER

## 2025-05-19 PROCEDURE — 3066F NEPHROPATHY DOC TX: CPT | Mod: CPTII,,, | Performed by: NURSE PRACTITIONER

## 2025-05-19 PROCEDURE — 99214 OFFICE O/P EST MOD 30 MIN: CPT | Mod: ,,, | Performed by: NURSE PRACTITIONER

## 2025-05-19 PROCEDURE — 3044F HG A1C LEVEL LT 7.0%: CPT | Mod: CPTII,,, | Performed by: NURSE PRACTITIONER

## 2025-05-19 PROCEDURE — 3078F DIAST BP <80 MM HG: CPT | Mod: CPTII,,, | Performed by: NURSE PRACTITIONER

## 2025-05-19 PROCEDURE — 3077F SYST BP >= 140 MM HG: CPT | Mod: CPTII,,, | Performed by: NURSE PRACTITIONER

## 2025-05-19 RX ORDER — SEMAGLUTIDE 1.34 MG/ML
1 INJECTION, SOLUTION SUBCUTANEOUS
COMMUNITY

## 2025-05-19 NOTE — PROGRESS NOTES
"Progress Note  Da Edwards  Chief Complaint   Patient presents with    Follow-up     VSG 5/15/23- 2yr No revision benifits....MagD? would be self pay if interested       History of Present Illness:  Mr. Da Edwards is a 58 y.o. male who is 2 yr s/p lap gastric sleeve surgery on 5/15/23 by Dm Tomas MD. Presents today for annual follow up appt. No complaints with tolerating PO. No dysphagia, odynophagia, GERD, NV, or abd pain. Regular BMs.      - increased cravings for sugar but Dr. Arambula had placed him on mounjaro and then ozempic and it is mostly controlling cravings and keeping his weight stable at this time. Not interested in revisional surgery.      Diet: compliant with bariatric meal plan for the most part.   Exercise: no dedicated regular exercise  Vitamins: compliant with bariatric supplementation          Labs (5/13/25): WNLs      Labs (4/30/24): A1c: 5.7%, glucose: 143, need b1, b12, CBC and iron panel.       Labs (12/27/23): glucose: 136, bun: 23,          Ht: 65in  Weights:   Trend Weights BMI   Starting 266# 44   Pre-Op 255#     2 Week 234# 38   2 Month 225 37   6 Month 209# 34    1 Year  213# 35    2 Year                  For Adults 20 Years and Older:     BMI Weight Status   Below 18.5 Underweight   18.6-24.9 Normal/Healthy   25.0-29.9 Overweight   30.0 & Above Obese      Ideal Weight Range for Your Height: 5'5" = 114 - 149 lbs                  Pertinent History:  HTN - [x] Yes   [] No  HLD - [x] Yes   [] No  DM  -  [x] Yes   [] No, A1c: 5.6%  LUCI - [x] Yes   [] No  GERD - [] Yes   [x] No  Anticoagulation- [] Yes   [x] No    Patient Care Team:  Anil Arambula MD as PCP - General (Family Medicine)  Raul Becerra MD as Consulting Physician (Cardiology)  Dm Tomas MD as Surgeon (Bariatrics)     Subjective:     12 point review of systems conducted, negative except as stated in the history of present illness. See HPI for details.    Review of patient's allergies " "indicates:  No Known Allergies  Past Medical History:   Diagnosis Date    Asymptomatic varicose veins of unspecified lower extremity     Binge eating disorder     Chronic venous stasis dermatitis of both lower extremities     Diabetes mellitus     Family history of prostate cancer     History of COVID-19     Insomnia     Low testosterone     Male erectile dysfunction, unspecified     Microalbuminuria     Obesity, unspecified     Osteopenia     Right inguinal hernia     Swelling      Past Surgical History:   Procedure Laterality Date    CIRCUMCISION      COLONOSCOPY      ESOPHAGOGASTRODUODENOSCOPY N/A 03/17/2023    Procedure: EGD (ESOPHAGOGASTRODUODENOSCOPY);  Surgeon: Dm Tomas MD;  Location: [facility] OR;  Service: General;  Laterality: N/A;    LAPAROSCOPIC SLEEVE GASTRECTOMY N/A 5/15/2023    Procedure: GASTRECTOMY, SLEEVE, LAPAROSCOPIC  **SELF PAY**;  Surgeon: Dm Tomas MD;  Location: [facility] OR;  Service: General;  Laterality: N/A;    WISDOM TOOTH EXTRACTION       Family History   Problem Relation Name Age of Onset    Diabetes Maternal Uncle Moussrekha      Social History[1]   Current Outpatient Medications   Medication Instructions    allopurinoL (ZYLOPRIM) 100 mg, Oral, 2 times daily    BD LUER-MAYITO SYRINGE 3 mL 18 x 1 1/2" Syrg USE ONCE WEEKLY WITH INJECTIONS    metoprolol succinate (TOPROL-XL) 50 MG 24 hr tablet take 1/2 tablet by mouth in the morning and 1 tablet in the evening    OZEMPIC 1 mg, Every 7 days    ranolazine (RANEXA) 500 mg, Oral, 2 times daily    rosuvastatin (CRESTOR) 20 mg, Oral, Nightly    sildenafiL (VIAGRA) 100 mg, Oral, Daily PRN    zolpidem (AMBIEN) 10 mg, Oral, Nightly       Objective:     Vital Signs (Most Recent):  Visit Vitals  BP (!) 147/77   Pulse 60   Ht 5' 5" (1.651 m)   Wt 97.1 kg (214 lb)   BMI 35.61 kg/m²       Physical Exam:  General:  Alert and oriented.    Respiratory:  Lungs are clear to auscultation, Respirations are non-labored, Breath sounds are equal.  "   Cardiovascular:  Normal rate, Regular rhythm, No murmur.    Gastrointestinal:  Soft, Non-tender, Non-distended, Normal bowel sounds        Musculoskeletal:  Normal range of motion, Normal strength.    Integumentary:  Warm, Dry, Pink.    Neurologic:  Alert, Oriented.    Psychiatric:  Cooperative.      Assessment:       ICD-10-CM ICD-9-CM   1. Encounter for weight loss counseling  Z71.3 V65.3   2. Dietary counseling  Z71.3 V65.3   3. Exercise counseling  Z71.82 V65.41   4. Obesity, unspecified class, unspecified obesity type, unspecified whether serious comorbidity present  E66.9 278.00       Plan:     1. Encounter for weight loss counseling        2. Dietary counseling        3. Exercise counseling        4. Obesity, unspecified class, unspecified obesity type, unspecified whether serious comorbidity present          - continue ozempic for diabetes, cravings, and weight gain.   - pt not interested in revisional weight loss surgery at this time, he is doing well on the injections and will continue it a this time. Will call if he needs.   - gave pt information about the bariatric one a day multivitamin. Need to continue calcium supplements while on them. continue  vitamin since it contains iron. please eat before taking, it can make pt's very nauseated on empty stomach.   - Discussed possible surgical risks with patient that can occur at any point in time such as but not limited to vitamin and mineral deficiency, ulceration from smoking/NSAIDs/Anti-inflammatory medications, gallbladder disfunction, etc. If patient has any severe abd pain that is constant, nausea, vomiting, disruption of bowel movements, or has other concerns they are instructed to call the office or present to the emergency room. Pt verbalized understanding   - F/U 1 year with bariatric labs (annually)  - Continue exercising and following bariatric vitamin supplementation  - Support group attendance encouraged   - Keep routine appts with  PCP/specialists as scheduled                    [1]   Social History  Tobacco Use    Smoking status: Former     Current packs/day: 0.00     Types: Cigarettes     Start date: 1994     Quit date:      Years since quittin.3    Smokeless tobacco: Never   Substance Use Topics    Alcohol use: Not Currently     Alcohol/week: 3.0 standard drinks of alcohol     Types: 1 Glasses of wine, 1 Cans of beer, 1 Shots of liquor per week     Comment: 3-4 times a year    Drug use: Not Currently     Types: Marijuana     Comment: every other month

## (undated) DEVICE — BINDER ABDOMINAL UNIV XLN 10IN

## (undated) DEVICE — SKINMARKER & RULER REGULAR X-F

## (undated) DEVICE — GLOVE PROTEXIS BLUE LATEX 7

## (undated) DEVICE — TROCAR ENDOPATH XCEL 5X100MM

## (undated) DEVICE — SYS HYDRO-SURG IRR SMOKE EVAC

## (undated) DEVICE — SHEARS HS LONG 5MM CURVED

## (undated) DEVICE — NDL GRANEE OPEN LOOP GRASPER

## (undated) DEVICE — COVER MAYO STAND REINFRCD 30

## (undated) DEVICE — HEMOSTAT SURGICEL FIBRLR 2X4IN

## (undated) DEVICE — APPLICATOR VISTASEAL RIG 35CM

## (undated) DEVICE — NDL SAFETY 21G X 1 1/2 ECLPSE

## (undated) DEVICE — GLOVE PROTEXIS PI SYN SURG 6.0

## (undated) DEVICE — SCISSOR CURVED ENDOPATH 5MM

## (undated) DEVICE — CANNULA ENDOPATH XCEL 5X100MM

## (undated) DEVICE — PAD PREP CUFFED NS 24X48IN

## (undated) DEVICE — POUCH INSTRUMENT ADH 10X18IN

## (undated) DEVICE — SOL NORMAL USPCA 0.9%

## (undated) DEVICE — ADHESIVE MASTISOL VIAL 48/BX

## (undated) DEVICE — CLIP ENDO LIGATION LARGE CLIPS

## (undated) DEVICE — GLOVE PROTEXIS LTX MICRO 6.5

## (undated) DEVICE — TROCAR ENDOPATH XCEL 15MM 10CM

## (undated) DEVICE — SUPPORT ULNA NERVE PROTECTOR

## (undated) DEVICE — CONTAINER SPECIMEN SCREW 4OZ

## (undated) DEVICE — GLOVE PROTEXIS PI SYN SURG 6.5

## (undated) DEVICE — GLOVE SURG BIOGEL LATEX SZ 7.5

## (undated) DEVICE — SPONGE LAP STRL 18X18IN

## (undated) DEVICE — RELOAD ECHELON FLEX GRN 60MM

## (undated) DEVICE — TOWEL OR DISP STRL BLUE 4/PK

## (undated) DEVICE — GLOVE PROTEXIS LTX MICRO  7.5

## (undated) DEVICE — GOWN X-LG STERILE BACK

## (undated) DEVICE — SUT ETHIBOND #0 EN-3 112CM

## (undated) DEVICE — NDL SYR 10ML 18X1.5 LL BLUNT

## (undated) DEVICE — TROCAR ENDOPATH XCEL 11MM 10CM

## (undated) DEVICE — RELOAD ECHELON FLEX BLU 60MM

## (undated) DEVICE — SEALANT VISTASEAL FIBRIN 10ML

## (undated) DEVICE — SOL IRRI STRL WATER 1000ML

## (undated) DEVICE — SUT VICRYL PLUS 4-0 FS-2 27IN

## (undated) DEVICE — TROCAR ENDOPATH XCEL 12X100MM

## (undated) DEVICE — Device

## (undated) DEVICE — GAUZE SPONGE 4X4 12PLY

## (undated) DEVICE — STAPLER ECHELON FLEX 60MM 44CM

## (undated) DEVICE — SUT VICRYL+ 27 UR-6 VIOL

## (undated) DEVICE — DRAPE FLUID WARMER ORS 44X44IN

## (undated) DEVICE — SUT TK QUIK LOAD

## (undated) DEVICE — DEVICE TK TI-KNOT 5MM REPL DEV